# Patient Record
Sex: FEMALE | URBAN - METROPOLITAN AREA
[De-identification: names, ages, dates, MRNs, and addresses within clinical notes are randomized per-mention and may not be internally consistent; named-entity substitution may affect disease eponyms.]

---

## 2022-02-10 ENCOUNTER — OFFICE VISIT (OUTPATIENT)
Dept: FAMILY MEDICINE CLINIC | Age: 57
End: 2022-02-10
Payer: MEDICARE

## 2022-02-10 VITALS
BODY MASS INDEX: 36.37 KG/M2 | RESPIRATION RATE: 18 BRPM | DIASTOLIC BLOOD PRESSURE: 67 MMHG | TEMPERATURE: 98.2 F | HEART RATE: 105 BPM | HEIGHT: 64 IN | SYSTOLIC BLOOD PRESSURE: 107 MMHG | OXYGEN SATURATION: 97 % | WEIGHT: 213 LBS

## 2022-02-10 DIAGNOSIS — E11.9 TYPE 2 DIABETES MELLITUS WITHOUT COMPLICATION, WITHOUT LONG-TERM CURRENT USE OF INSULIN (HCC): Primary | ICD-10-CM

## 2022-02-10 DIAGNOSIS — R07.89 OTHER CHEST PAIN: ICD-10-CM

## 2022-02-10 PROCEDURE — G8427 DOCREV CUR MEDS BY ELIG CLIN: HCPCS | Performed by: NURSE PRACTITIONER

## 2022-02-10 PROCEDURE — 3017F COLORECTAL CA SCREEN DOC REV: CPT | Performed by: NURSE PRACTITIONER

## 2022-02-10 PROCEDURE — 99204 OFFICE O/P NEW MOD 45 MIN: CPT | Performed by: NURSE PRACTITIONER

## 2022-02-10 PROCEDURE — G8417 CALC BMI ABV UP PARAM F/U: HCPCS | Performed by: NURSE PRACTITIONER

## 2022-02-10 PROCEDURE — 2022F DILAT RTA XM EVC RTNOPTHY: CPT | Performed by: NURSE PRACTITIONER

## 2022-02-10 PROCEDURE — 93000 ELECTROCARDIOGRAM COMPLETE: CPT | Performed by: NURSE PRACTITIONER

## 2022-02-10 PROCEDURE — 3046F HEMOGLOBIN A1C LEVEL >9.0%: CPT | Performed by: NURSE PRACTITIONER

## 2022-02-10 PROCEDURE — G8432 DEP SCR NOT DOC, RNG: HCPCS | Performed by: NURSE PRACTITIONER

## 2022-02-10 RX ORDER — FLASH GLUCOSE SENSOR
KIT MISCELLANEOUS
Qty: 6 KIT | Refills: 2 | Status: SHIPPED | OUTPATIENT
Start: 2022-02-10 | End: 2022-02-16 | Stop reason: SDUPTHER

## 2022-02-10 RX ORDER — SPIRONOLACTONE 100 MG/1
TABLET, FILM COATED ORAL
COMMUNITY
Start: 2022-01-13

## 2022-02-10 RX ORDER — METFORMIN HYDROCHLORIDE 1000 MG/1
1000 TABLET ORAL 2 TIMES DAILY WITH MEALS
COMMUNITY
End: 2022-02-10 | Stop reason: SDUPTHER

## 2022-02-10 RX ORDER — ADAPALENE 3 MG/G
GEL TOPICAL DAILY
Qty: 45 G | Status: CANCELLED | OUTPATIENT
Start: 2022-02-10

## 2022-02-10 RX ORDER — ADAPALENE 3 MG/G
GEL TOPICAL DAILY
COMMUNITY
Start: 2021-09-22 | End: 2022-03-15

## 2022-02-10 RX ORDER — METFORMIN HYDROCHLORIDE 1000 MG/1
1000 TABLET ORAL 2 TIMES DAILY WITH MEALS
Qty: 180 TABLET | Refills: 0 | Status: SHIPPED | OUTPATIENT
Start: 2022-02-10 | End: 2022-04-13

## 2022-02-10 RX ORDER — SPIRONOLACTONE 100 MG/1
TABLET, FILM COATED ORAL
Status: CANCELLED | OUTPATIENT
Start: 2022-02-10

## 2022-02-10 NOTE — PROGRESS NOTES
[facility], [facility] [address]               [phone]      Kana Chris is a 64 y.o. female and presents with Establish Care (Patient state I would like to dicuss diabetic issues.) and Diabetes       Assessment/Plan:    Diagnoses and all orders for this visit:    1. Type 2 diabetes mellitus without complication, without long-term current use of insulin (HCC)  -     metFORMIN (GLUCOPHAGE) 1,000 mg tablet; Take 1 Tablet by mouth two (2) times daily (with meals). Take 1,000 mg by mouth two (2) times daily (with meals). -     METABOLIC PANEL, COMPREHENSIVE; Future  -     LIPID PANEL; Future  -     HEMOGLOBIN A1C WITH EAG; Future  -     MICROALBUMIN, UR, RAND W/ MICROALB/CREAT RATIO; Future  New diagnosis, has lab work from 11/24/21 showing A1C of 14.8%  Start metformin  Educated on diabetes: relationship to carbohydrates and sugars, roll of exercise in glucose control, healthy eating  Follow up labs prior to next visit  Patient verbalized understanding and is in agreement with this plan of care  2. Other chest pain  -     AMB POC EKG ROUTINE W/ 12 LEADS, INTER & REP  Endorses chest pain of several years duration, happens with activity and rest  12 lead EKG obtained, NSR, no ST or T wave abnormalities, no pathological Q waves  Reviewed by me  This is most likely muscular do to the long standing nature  Will continue to monitor      Follow-up and Dispositions    · Return in about 4 weeks (around 3/10/2022) for DM-new onset, 15 min, office only, with, labs prior.            Health Maintenance:   Health Maintenance   Topic Date Due    Hepatitis C Screening  Never done    COVID-19 Vaccine (1) 07/26/1970    Pneumococcal 0-64 years (1 of 2 - PPSV23) Never done    Foot Exam Q1  Never done    A1C test (Diabetic or Prediabetic)  Never done    MICROALBUMIN Q1  Never done    Eye Exam Retinal or Dilated  Never done    Lipid Screen  Never done    Cervical cancer screen  Never done  Colorectal Cancer Screening Combo  Never done    Shingrix Vaccine Age 50> (1 of 2) Never done    Breast Cancer Screen Mammogram  Never done    Medicare Yearly Exam  02/10/2022    Depression Screen  02/10/2023    DTaP/Tdap/Td series (2 - Td or Tdap) 2023    Flu Vaccine  Completed        Subjective:    Labs obtained prior to visit? NO  Reviewed with patient? Not applicable    Visit to establish care, ivelisse pcp was with [facility]  Had pap and mammo in     Chest pain  Onset: past few years  Location: left side of chest, sometimes above breast, sometimes stabbing below breast and sometimes feels like in my breast  Happens with rest and activity  Happens more so with rest  Has changed to underwire bra for better support  Denies radiation, n/v, diaphoresis      DMII- new diagnosis, educated today   Patient reports medication compliance Daily  Diabetic diet compliance most of the time  Patient monitors blood sugars regularly does not have a glucometer   Reports am fasting sugars range does not check   Denies hypoglycemic episodes yes  Denies polyuria, polydipsia, paraesthesia, vision changes? yes  Engaging in daily exercise? Yes: Comment: walkinmin, step aerobics, spinning     Diabetic Foot and Eye Exam  Status   Topic Date Due    Diabetic Foot Care  Never done    Eye Exam  Never done     No results found for: HBA1C, [id number], [id number]]  No results found for: CREAU, MCAU2, MCACR  Key Antihyperglycemic Medications             metFORMIN (GLUCOPHAGE) 1,000 mg tablet (Taking) Take 1 Tablet by mouth two (2) times daily (with meals). Take 1,000 mg by mouth two (2) times daily (with meals). ROS:     ROS  As stated in HPI, otherwise all others negative. The problem list was updated as a part of today's visit.   Patient Active Problem List   Diagnosis Code    Acne L70.9    Type 2 diabetes mellitus without complication, without long-term current use of insulin (HCC) E11.9       The PS,  were reviewed. SH:  Social History     Tobacco Use    Smoking status: Not on file    Smokeless tobacco: Never Used   Substance Use Topics    Alcohol use: Never    Drug use: Never       Medications/Allergies:  Current Outpatient Medications on File Prior to Visit   Medication Sig Dispense Refill    adapalene (DIFFERIN) 0.3 % topical gel Apply  to affected area daily.  spironolactone (ALDACTONE) 100 mg tablet        No current facility-administered medications on file prior to visit. Not on File    Objective:  Visit Vitals  /67 (BP 1 Location: Left arm, BP Patient Position: Sitting, BP Cuff Size: Adult) Comment (BP Patient Position): feet flat on floor   Pulse (!) 105   Temp 98.2 °F (36.8 °C) (Temporal)   Resp 18   Ht 5' 4\" (1.626 m)   Wt 213 lb (96.6 kg)   SpO2 97%   BMI 36.56 kg/m²    Body mass index is 36.56 kg/m². Physical assessment  Physical Exam  Vitals and nursing note reviewed. Eyes:      Conjunctiva/sclera: Conjunctivae normal.      Pupils: Pupils are equal, round, and reactive to light. Cardiovascular:      Rate and Rhythm: Normal rate and regular rhythm. Heart sounds: Normal heart sounds. No murmur heard. No friction rub. No gallop. Pulmonary:      Effort: Pulmonary effort is normal.      Breath sounds: Normal breath sounds. Musculoskeletal:         General: Normal range of motion. Cervical back: Normal range of motion. Skin:     General: Skin is warm and dry. Neurological:      Mental Status: She is alert.            Labwork and Ancillary Studies:    CBC w/Diff  No results found for: WBC, WBCLT, HGB, HGBP, PLT, HGBEXT, PLTEXT, HGBEXT, PLTEXT      Basic Metabolic Profile  No results found for: NA, K, CL, CO2, AGAP, GLU, BUN, CREA, BUCR, GFRAA, GFRNA, CA, GFRAA     Cholesterol  No results found for: CHOL, CHOLX, CHLST, CHOLV, HDL, HDLP, LDL, LDLC, DLDLP, TGLX, TRIGL, TRIGP, CHHD, CHHDX        I have discussed the diagnosis with the patient and the intended plan as seen in the above orders. The patient has received an After-Visit Summary and questions were answered concerning future plans. An After Visit Summary was printed and given to the patient. All diagnosis have been discussed with the patient and all of the patient's questions have been answered. Follow-up and Dispositions    · Return in about 4 weeks (around 3/10/2022) for DM-new onset, 15 min, office only, with, labs prior. [facility]  [address] [facility]   [address] [facility] [address]  [address]

## 2022-02-10 NOTE — PROGRESS NOTES
Room 7    Did patient bring someone? No    Did the patient have DME equipment? No     Did you take your medication today? No     1. \"Have you been to the ER, urgent care clinic since your last visit? Hospitalized since your last visit? \" No    2. \"Have you seen or consulted any other health care providers outside of the [address] [place] [address] since your last visit? \" Yes Patient states I was going to Dr. Devika Medina for Acne and  at [facility]      3. For patients aged 39-70: Has the patient had a colonoscopy / FIT/ Cologuard? No      If the patient is female:    4. For patients aged 41-77: Has the patient had a mammogram within the past 2 years? No Patient state I had my mammo done around 18 months ago through [facility]       5. For patients aged 21-65: Has the patient had a pap smear? NoPatient states I received a pap when I did the mammo .           3 most recent PHQ Screens 2/10/2022   Little interest or pleasure in doing things Not at all   Feeling down, depressed, irritable, or hopeless Not at all   Total Score PHQ 2 0         Health Maintenance Due   Topic Date Due    Hepatitis C Screening  Never done    Depression Screen  Never done    COVID-19 Vaccine (1) 07/26/1970    DTaP/Tdap/Td series (1 - Tdap) Never done    Cervical cancer screen  Never done    Lipid Screen  Never done    Colorectal Cancer Screening Combo  Never done    Shingrix Vaccine Age 50> (1 of 2) Never done    Breast Cancer Screen Mammogram  Never done       Learning Assessment 2/10/2022   PRIMARY LEARNER Patient   PRIMARY LANGUAGE ENGLISH   LEARNER PREFERENCE PRIMARY OTHER (COMMENT)   ANSWERED BY Patient    RELATIONSHIP SELF

## 2022-02-15 ENCOUNTER — TELEPHONE (OUTPATIENT)
Dept: FAMILY MEDICINE CLINIC | Age: 57
End: 2022-02-15

## 2022-02-15 DIAGNOSIS — E11.9 TYPE 2 DIABETES MELLITUS WITHOUT COMPLICATION, WITHOUT LONG-TERM CURRENT USE OF INSULIN (HCC): ICD-10-CM

## 2022-02-15 NOTE — TELEPHONE ENCOUNTER
Patient called to let you know that the Araya CGM is not covered under medicare. Patient would like Lamar CGM prescriptions sent to [address] [place] [address] [facility]. (In chart.) so she can use the [employer] card.

## 2022-02-16 RX ORDER — FLASH GLUCOSE SENSOR
KIT MISCELLANEOUS
Qty: 6 KIT | Refills: 2 | Status: SHIPPED | OUTPATIENT
Start: 2022-02-16

## 2022-02-22 NOTE — TELEPHONE ENCOUNTER
Spoke to patient about prescription has been sent to pharmacy. Patient states the Araya is still high and I would like to switch to the regular meter ans strips and lancets .

## 2022-02-23 RX ORDER — INSULIN PUMP SYRINGE, 3 ML
EACH MISCELLANEOUS
Qty: 1 KIT | Refills: 0 | Status: SHIPPED | OUTPATIENT
Start: 2022-02-23

## 2022-02-23 RX ORDER — LANCETS
EACH MISCELLANEOUS
Qty: 100 EACH | Refills: 3 | Status: SHIPPED | OUTPATIENT
Start: 2022-02-23

## 2022-03-10 ENCOUNTER — HOSPITAL ENCOUNTER (OUTPATIENT)
Dept: LAB | Age: 57
Discharge: HOME | End: 2022-03-10
Payer: MEDICARE

## 2022-03-10 LAB
ALBUMIN SERPL-MCNC: 4 G/DL (ref 3.4–5)
ALBUMIN/GLOB SERPL: 1.1 {RATIO} (ref 0.8–1.7)
ALP SERPL-CCNC: 87 U/L (ref 45–117)
ALT SERPL-CCNC: 35 U/L (ref 13–56)
ANION GAP SERPL CALC-SCNC: 1 MMOL/L (ref 3–18)
AST SERPL-CCNC: 32 U/L (ref 10–38)
BILIRUB SERPL-MCNC: 0.4 MG/DL (ref 0.2–1)
BUN SERPL-MCNC: 13 MG/DL (ref 7–18)
BUN/CREAT SERPL: 12 (ref 12–20)
CALCIUM SERPL-MCNC: 9.9 MG/DL (ref 8.5–10.1)
CHLORIDE SERPL-SCNC: 108 MMOL/L (ref 100–111)
CHOLEST SERPL-MCNC: 183 MG/DL
CO2 SERPL-SCNC: 28 MMOL/L (ref 21–32)
CREAT SERPL-MCNC: 1.07 MG/DL (ref 0.6–1.3)
CREAT UR-MCNC: 87 MG/DL (ref 30–125)
EST. AVERAGE GLUCOSE BLD GHB EST-MCNC: 134 MG/DL
GLOBULIN SER CALC-MCNC: 3.6 G/DL (ref 2–4)
GLUCOSE SERPL-MCNC: 100 MG/DL (ref 74–99)
HBA1C MFR BLD: 6.3 % (ref 4.2–5.6)
HDLC SERPL-MCNC: 61 MG/DL (ref 40–60)
HDLC SERPL: 3 {RATIO} (ref 0–5)
LDLC SERPL CALC-MCNC: 105.8 MG/DL (ref 0–100)
LIPID PROFILE,FLP: ABNORMAL
MICROALBUMIN UR-MCNC: 0.84 MG/DL (ref 0–3)
MICROALBUMIN/CREAT UR-RTO: 10 MG/G (ref 0–30)
POTASSIUM SERPL-SCNC: 5.3 MMOL/L (ref 3.5–5.5)
PROT SERPL-MCNC: 7.6 G/DL (ref 6.4–8.2)
SODIUM SERPL-SCNC: 137 MMOL/L (ref 136–145)
TRIGL SERPL-MCNC: 81 MG/DL (ref ?–150)
VLDLC SERPL CALC-MCNC: 16.2 MG/DL

## 2022-03-10 PROCEDURE — 36415 COLL VENOUS BLD VENIPUNCTURE: CPT

## 2022-03-10 PROCEDURE — 83036 HEMOGLOBIN GLYCOSYLATED A1C: CPT

## 2022-03-10 PROCEDURE — 80061 LIPID PANEL: CPT

## 2022-03-10 PROCEDURE — 80053 COMPREHEN METABOLIC PANEL: CPT

## 2022-03-10 PROCEDURE — 82043 UR ALBUMIN QUANTITATIVE: CPT

## 2022-03-15 ENCOUNTER — OFFICE VISIT (OUTPATIENT)
Dept: FAMILY MEDICINE CLINIC | Age: 57
End: 2022-03-15
Payer: MEDICARE

## 2022-03-15 VITALS
TEMPERATURE: 98.2 F | RESPIRATION RATE: 18 BRPM | BODY MASS INDEX: 37.01 KG/M2 | HEART RATE: 88 BPM | HEIGHT: 64 IN | SYSTOLIC BLOOD PRESSURE: 107 MMHG | WEIGHT: 216.8 LBS | DIASTOLIC BLOOD PRESSURE: 76 MMHG

## 2022-03-15 DIAGNOSIS — Z12.11 COLON CANCER SCREENING: ICD-10-CM

## 2022-03-15 DIAGNOSIS — Z12.31 ENCOUNTER FOR SCREENING MAMMOGRAM FOR MALIGNANT NEOPLASM OF BREAST: ICD-10-CM

## 2022-03-15 DIAGNOSIS — E11.9 TYPE 2 DIABETES MELLITUS WITHOUT COMPLICATION, WITHOUT LONG-TERM CURRENT USE OF INSULIN (HCC): Primary | ICD-10-CM

## 2022-03-15 PROCEDURE — G8417 CALC BMI ABV UP PARAM F/U: HCPCS | Performed by: NURSE PRACTITIONER

## 2022-03-15 PROCEDURE — 2022F DILAT RTA XM EVC RTNOPTHY: CPT | Performed by: NURSE PRACTITIONER

## 2022-03-15 PROCEDURE — 3017F COLORECTAL CA SCREEN DOC REV: CPT | Performed by: NURSE PRACTITIONER

## 2022-03-15 PROCEDURE — 99213 OFFICE O/P EST LOW 20 MIN: CPT | Performed by: NURSE PRACTITIONER

## 2022-03-15 PROCEDURE — G9899 SCRN MAM PERF RSLTS DOC: HCPCS | Performed by: NURSE PRACTITIONER

## 2022-03-15 PROCEDURE — G8427 DOCREV CUR MEDS BY ELIG CLIN: HCPCS | Performed by: NURSE PRACTITIONER

## 2022-03-15 PROCEDURE — 3044F HG A1C LEVEL LT 7.0%: CPT | Performed by: NURSE PRACTITIONER

## 2022-03-15 PROCEDURE — G8432 DEP SCR NOT DOC, RNG: HCPCS | Performed by: NURSE PRACTITIONER

## 2022-03-15 NOTE — PROGRESS NOTES
Room 9    Did patient bring someone? No    Did the patient have DME equipment? No     Did you take your medication today? Patient states not yet       1. \"Have you been to the ER, urgent care clinic since your last visit? Hospitalized since your last visit? \" No    2. \"Have you seen or consulted any other health care providers outside of the [address] [place] [address] since your last visit? \" No     3. For patients aged 39-70: Has the patient had a colonoscopy / FIT/ Cologuard? No      If the patient is female:    4. For patients aged 41-77: Has the patient had a mammogram within the past 2 years? No      5. For patients aged 21-65: Has the patient had a pap smear?  No Patient states I will schedule an appointment for pap        3 most recent PHQ Screens 3/15/2022   Little interest or pleasure in doing things Not at all   Feeling down, depressed, irritable, or hopeless Not at all   Total Score PHQ 2 0         Health Maintenance Due   Topic Date Due    Hepatitis C Screening  Never done    COVID-19 Vaccine (1) 07/26/1970    Pneumococcal 0-64 years (1 of 2 - PPSV23) Never done    Foot Exam Q1  Never done    Eye Exam Retinal or Dilated  Never done    Cervical cancer screen  Never done    Colorectal Cancer Screening Combo  Never done    Shingrix Vaccine Age 50> (1 of 2) Never done    Breast Cancer Screen Mammogram  Never done    Medicare Yearly Exam  Never done       Learning Assessment 2/10/2022   PRIMARY LEARNER Patient   PRIMARY LANGUAGE ENGLISH   LEARNER PREFERENCE PRIMARY OTHER (COMMENT)   ANSWERED BY Patient    RELATIONSHIP SELF

## 2022-03-15 NOTE — PROGRESS NOTES
[place]               [facility] [address]               [phone]      Franky Mustafa is a 64 y.o. female and presents with Follow Up Chronic Condition and Diabetes       Assessment/Plan:    Diagnoses and all orders for this visit:    1. Type 2 diabetes mellitus without complication, without long-term current use of insulin (HCC)  -     METABOLIC PANEL, COMPREHENSIVE; Future  -     HEMOGLOBIN A1C WITH EAG; Future  Diabetes well controlled on metformin  A1C 6.3%  Patient working diligently on her diet and exercise  Commended her on her efforts  Continue metformin  Labs prior to next visit  Patient verbalized understanding and is in agreement with this plan of care  2. Encounter for screening mammogram for malignant neoplasm of breast  -     ALEKSEY MAMMO BI SCREENING INCL CAD; Future  Health maintenance needs addressed  3. Colon cancer screening  -     COLOGUARD TEST (FECAL DNA COLORECTAL CANCER SCREENING)  Health maintenance needs addressed      Follow-up and Dispositions    · Return in about 3 months (around 6/15/2022) for DM, DM foot, 30 min, office only, with, labs prior. Health Maintenance:   Health Maintenance   Topic Date Due    Hepatitis C Screening  Never done    COVID-19 Vaccine (1) 07/26/1970    Pneumococcal 0-64 years (1 of 2 - PPSV23) Never done    Foot Exam Q1  Never done    Cervical cancer screen  Never done    Colorectal Cancer Screening Combo  Never done    Shingrix Vaccine Age 50> (1 of 2) Never done    Breast Cancer Screen Mammogram  Never done    Medicare Yearly Exam  Never done    Eye Exam Retinal or Dilated  07/15/2022 (Originally 7/26/1975)    Depression Screen  02/10/2023    A1C test (Diabetic or Prediabetic)  03/10/2023    MICROALBUMIN Q1  03/10/2023    Lipid Screen  03/10/2023    DTaP/Tdap/Td series (3 - Td or Tdap) 11/24/2031    Flu Vaccine  Completed        Subjective:    Labs obtained prior to visit? YES  Reviewed with patient? Yes    Diabetes  Follow up from starting metformin 3 months ago  A1C good at 6.3%  Home glucose readings averaging   States she has been exercising and dieting  30-45min a day: treadmill, bike, aerobics, trampoline  Monitoring her carbohydrate and sugar intake      ROS:     ROS  As stated in HPI, otherwise all others negative. The problem list was updated as a part of today's visit. Patient Active Problem List   Diagnosis Code    Acne L70.9    Type 2 diabetes mellitus without complication, without long-term current use of insulin ([facility]) E11.9       The PSH, FH were reviewed. SH:  Social History     Tobacco Use    Smoking status: Never Smoker    Smokeless tobacco: Never Used   Substance Use Topics    Alcohol use: Never    Drug use: Never       Medications/Allergies:  Current Outpatient Medications on File Prior to Visit   Medication Sig Dispense Refill    Blood-Glucose Meter monitoring kit Check glucose once a day in the morning prior to breakfast 1 Kit 0    lancets misc Check glucose once a day in the morning prior to breakfast 100 Each 3    glucose blood VI test strips (ASCENSIA AUTODISC VI, ONE TOUCH ULTRA TEST VI) strip Check glucose once a day in the morning prior to breakfast 100 Strip 3    flash glucose sensor (FreeStyle Lamar 2 Sensor) kit Check blood sugar four times a day: AC and HS 6 Kit 2    spironolactone (ALDACTONE) 100 mg tablet       metFORMIN (GLUCOPHAGE) 1,000 mg tablet Take 1 Tablet by mouth two (2) times daily (with meals). Take 1,000 mg by mouth two (2) times daily (with meals). 180 Tablet 0    [DISCONTINUED] adapalene (DIFFERIN) 0.3 % topical gel Apply  to affected area daily. (Patient not taking: Reported on 3/15/2022)       No current facility-administered medications on file prior to visit.         Not on File    Objective:  Visit Vitals  /76 (BP 1 Location: Left arm, BP Patient Position: Sitting, BP Cuff Size: Small adult) Comment (BP Patient Position): feet flat on floor   Pulse 88   Temp 98.2 °F (36.8 °C) (Temporal)   Resp 18   Ht 5' 4\" (1.626 m)   Wt 216 lb 12.8 oz (98.3 kg)   BMI 37.21 kg/m²    Body mass index is 37.21 kg/m². Physical assessment  Physical Exam  Vitals and nursing note reviewed. Eyes:      Conjunctiva/sclera: Conjunctivae normal.      Pupils: Pupils are equal, round, and reactive to light. Cardiovascular:      Rate and Rhythm: Normal rate and regular rhythm. Heart sounds: Normal heart sounds. No murmur heard. No friction rub. No gallop. Pulmonary:      Effort: Pulmonary effort is normal.      Breath sounds: Normal breath sounds. Musculoskeletal:         General: Normal range of motion. Cervical back: Normal range of motion. Skin:     General: Skin is warm and dry. Neurological:      Mental Status: She is alert. Labwork and Ancillary Studies:    CBC w/Diff  No results found for: WBC, WBCLT, HGB, HGBP, PLT, HGBEXT, PLTEXT, HGBEXT, PLTEXT      Basic Metabolic Profile  Lab Results   Component Value Date/Time    Sodium 137 03/10/2022 03:06 PM    Potassium 5.3 03/10/2022 03:06 PM    Chloride 108 03/10/2022 03:06 PM    CO2 28 03/10/2022 03:06 PM    Anion gap 1 (L) 03/10/2022 03:06 PM    Glucose 100 (H) 03/10/2022 03:06 PM    BUN 13 03/10/2022 03:06 PM    Creatinine 1.07 03/10/2022 03:06 PM    BUN/Creatinine ratio 12 03/10/2022 03:06 PM    GFR est AA >60 03/10/2022 03:06 PM    GFR est non-AA 53 (L) 03/10/2022 03:06 PM    Calcium 9.9 03/10/2022 03:06 PM        Cholesterol  Lab Results   Component Value Date/Time    Cholesterol, total 183 03/10/2022 03:06 PM    HDL Cholesterol 61 (H) 03/10/2022 03:06 PM    LDL, calculated 105.8 (H) 03/10/2022 03:06 PM    Triglyceride 81 03/10/2022 03:06 PM    CHOL/HDL Ratio 3.0 03/10/2022 03:06 PM           I have discussed the diagnosis with the patient and the intended plan as seen in the above orders.   The patient has received an After-Visit Summary and questions were answered concerning future plans. An After Visit Summary was printed and given to the patient. All diagnosis have been discussed with the patient and all of the patient's questions have been answered. Follow-up and Dispositions    · Return in about 3 months (around 6/15/2022) for DM, DM foot, 30 min, office only, with, labs prior. Dawood Estevez, AGNP-BC  [address]  [address]

## 2022-03-19 PROBLEM — E11.9 TYPE 2 DIABETES MELLITUS WITHOUT COMPLICATION, WITHOUT LONG-TERM CURRENT USE OF INSULIN (HCC): Status: ACTIVE | Noted: 2022-02-10

## 2022-04-13 DIAGNOSIS — E11.9 TYPE 2 DIABETES MELLITUS WITHOUT COMPLICATION, WITHOUT LONG-TERM CURRENT USE OF INSULIN (HCC): ICD-10-CM

## 2022-04-13 RX ORDER — METFORMIN HYDROCHLORIDE 1000 MG/1
TABLET ORAL
Qty: 180 TABLET | Refills: 3 | Status: SHIPPED | OUTPATIENT
Start: 2022-04-13 | End: 2022-06-15 | Stop reason: SDUPTHER

## 2022-06-08 ENCOUNTER — HOSPITAL ENCOUNTER (OUTPATIENT)
Dept: LAB | Age: 57
Discharge: HOME | End: 2022-06-08
Payer: MEDICARE

## 2022-06-08 LAB
ALBUMIN SERPL-MCNC: 4 G/DL (ref 3.4–5)
ALBUMIN/GLOB SERPL: 1.1 {RATIO} (ref 0.8–1.7)
ALP SERPL-CCNC: 84 U/L (ref 45–117)
ALT SERPL-CCNC: 37 U/L (ref 13–56)
ANION GAP SERPL CALC-SCNC: 3 MMOL/L (ref 3–18)
AST SERPL-CCNC: 32 U/L (ref 10–38)
BILIRUB SERPL-MCNC: 0.4 MG/DL (ref 0.2–1)
BUN SERPL-MCNC: 13 MG/DL (ref 7–18)
BUN/CREAT SERPL: 14 (ref 12–20)
CALCIUM SERPL-MCNC: 9.6 MG/DL (ref 8.5–10.1)
CHLORIDE SERPL-SCNC: 108 MMOL/L (ref 100–111)
CO2 SERPL-SCNC: 26 MMOL/L (ref 21–32)
CREAT SERPL-MCNC: 0.96 MG/DL (ref 0.6–1.3)
EST. AVERAGE GLUCOSE BLD GHB EST-MCNC: 131 MG/DL
GLOBULIN SER CALC-MCNC: 3.8 G/DL (ref 2–4)
GLUCOSE SERPL-MCNC: 113 MG/DL (ref 74–99)
HBA1C MFR BLD: 6.2 % (ref 4.2–5.6)
POTASSIUM SERPL-SCNC: 4.7 MMOL/L (ref 3.5–5.5)
PROT SERPL-MCNC: 7.8 G/DL (ref 6.4–8.2)
SODIUM SERPL-SCNC: 137 MMOL/L (ref 136–145)

## 2022-06-08 PROCEDURE — 83036 HEMOGLOBIN GLYCOSYLATED A1C: CPT

## 2022-06-08 PROCEDURE — 36415 COLL VENOUS BLD VENIPUNCTURE: CPT

## 2022-06-08 PROCEDURE — 80053 COMPREHEN METABOLIC PANEL: CPT

## 2022-06-14 ENCOUNTER — TELEPHONE (OUTPATIENT)
Dept: PHARMACY | Age: 57
End: 2022-06-14

## 2022-06-14 DIAGNOSIS — E11.9 TYPE 2 DIABETES MELLITUS WITHOUT COMPLICATION, WITHOUT LONG-TERM CURRENT USE OF INSULIN (HCC): Primary | ICD-10-CM

## 2022-06-14 NOTE — TELEPHONE ENCOUNTER
Sofia Tony NP - would patient benefit from statin therapy? Peggy Frey has an appointment with you 6/15/2022. She has been identified as a person for a care gap of Diabetes with out a statin  Diabetes, Over 39y/o and LDL above goal of 70    She is a candidate for at least moderate-intensity statin therapy. Please consider starting a statin at this OV for this patient. Thank you,  Moon Brizuela, PharmD, [address] [facility], toll free: [phone] Option 2  ==============================================================  POPULATION HEALTH CLINICAL PHARMACY: STATIN THERAPY REVIEW  Identified statin use in persons with diabetes care gap per [employer]  Records dated: 06/14/2022. Last Visit: 03/15/2022    ASSESSMENT  DIABETES ADHERENCE  Per Insurance Records through 06/14/2022 (2021 [place] = NA; YTD [place] = 100%; Potential Fail Date: 10/13/2022): Metformin last filled on 04/25/2022 for 90 day supply. Next refill due: 07/24/2022       Per [facility]:   Metformin last picked up on 04/25/2022 for 90 day supply. 3 refills remaining.  Billed through [facility] Rx Value Date/Time    Hemoglobin A1c 6.2 (H) 06/08/2022 03:24 PM    Hemoglobin A1c 6.3 (H) 03/10/2022 03:06 PM     NOTE A1c <9%    STATIN GAP IDENTIFIED    Patient is not prescribed a statin   Lab Results   Component Value Date/Time    Cholesterol, total 183 03/10/2022 03:06 PM    HDL Cholesterol 61 (H) 03/10/2022 03:06 PM    LDL, calculated 105.8 (H) 03/10/2022 03:06 PM    VLDL, calculated 16.2 03/10/2022 03:06 PM    Triglyceride 81 03/10/2022 03:06 PM    CHOL/HDL Ratio 3.0 03/10/2022 03:06 PM     ALT (SGPT)   Date Value Ref Range Status   06/08/2022 37 13 - 56 U/L Final     AST (SGOT)   Date Value Ref Range Status   06/08/2022 32 10 - 38 U/L Final     The ASCVD Risk score (James Bernardo., et al., 2013) failed to calculate for the following reasons:    Unable to determine if patient is Non-      Hyperlipidemia Goal: Patient is not currently prescribed moderate-intensity statin therapy. 2019 ADA Guidelines Age:   Ning Do[place] >/= 36years old:   o No history of ASCVD or 10-year ASCVD risk < 20% - moderate-intensity statin is recommended.   o History of ASCVD or 10-year ASCVD risk > 20% - high-intensity statin is recommended.      PLAN  The following are interventions that have been identified:  - Patient identified as having SUPD gap        Future Appointments   Date Time Provider Rishabh Silveira   6/15/2022  3:15 PM ALISIA Choi 49, PharmD, [address] [place] [address], toll free: [phone] Option 2

## 2022-06-15 ENCOUNTER — OFFICE VISIT (OUTPATIENT)
Dept: FAMILY MEDICINE CLINIC | Age: 57
End: 2022-06-15
Payer: MEDICARE

## 2022-06-15 VITALS
OXYGEN SATURATION: 100 % | TEMPERATURE: 98.2 F | RESPIRATION RATE: 18 BRPM | HEART RATE: 97 BPM | SYSTOLIC BLOOD PRESSURE: 109 MMHG | HEIGHT: 64 IN | WEIGHT: 222 LBS | DIASTOLIC BLOOD PRESSURE: 74 MMHG | BODY MASS INDEX: 37.9 KG/M2

## 2022-06-15 DIAGNOSIS — Z00.00 MEDICARE ANNUAL WELLNESS VISIT, SUBSEQUENT: Primary | ICD-10-CM

## 2022-06-15 DIAGNOSIS — E11.9 TYPE 2 DIABETES MELLITUS WITHOUT COMPLICATION, WITHOUT LONG-TERM CURRENT USE OF INSULIN (HCC): ICD-10-CM

## 2022-06-15 PROCEDURE — G8427 DOCREV CUR MEDS BY ELIG CLIN: HCPCS | Performed by: NURSE PRACTITIONER

## 2022-06-15 PROCEDURE — G0439 PPPS, SUBSEQ VISIT: HCPCS | Performed by: NURSE PRACTITIONER

## 2022-06-15 PROCEDURE — G8432 DEP SCR NOT DOC, RNG: HCPCS | Performed by: NURSE PRACTITIONER

## 2022-06-15 PROCEDURE — 3044F HG A1C LEVEL LT 7.0%: CPT | Performed by: NURSE PRACTITIONER

## 2022-06-15 PROCEDURE — 99213 OFFICE O/P EST LOW 20 MIN: CPT | Performed by: NURSE PRACTITIONER

## 2022-06-15 PROCEDURE — 3017F COLORECTAL CA SCREEN DOC REV: CPT | Performed by: NURSE PRACTITIONER

## 2022-06-15 PROCEDURE — G9899 SCRN MAM PERF RSLTS DOC: HCPCS | Performed by: NURSE PRACTITIONER

## 2022-06-15 RX ORDER — METFORMIN HYDROCHLORIDE 1000 MG/1
1000 TABLET ORAL 2 TIMES DAILY WITH MEALS
Qty: 180 TABLET | Refills: 3 | Status: SHIPPED | OUTPATIENT
Start: 2022-06-15

## 2022-06-15 NOTE — PATIENT INSTRUCTIONS
Medicare Wellness Visit, Female     The best way to live healthy is to have a lifestyle where you eat a well-balanced diet, exercise regularly, limit alcohol use, and quit all forms of tobacco/nicotine, if applicable. Regular preventive services are another way to keep healthy. Preventive services (vaccines, screening tests, monitoring & exams) can help personalize your care plan, which helps you manage your own care. Screening tests can find health problems at the earliest stages, when they are easiest to treat. [facility] follows the current, evidence-based guidelines published by the [facility] Landis ([facility]) when recommending preventive services for our patients. Because we follow these guidelines, sometimes recommendations change over time as research supports it. (For example, mammograms used to be recommended annually. Even though Medicare will still pay for an annual mammogram, the newer guidelines recommend a mammogram every two years for women of average risk). Of course, you and your doctor may decide to screen more often for some diseases, based on your risk and your co-morbidities (chronic disease you are already diagnosed with). Preventive services for you include:  - Medicare offers their members a free annual wellness visit, which is time for you and your primary care provider to discuss and plan for your preventive service needs. Take advantage of this benefit every year!  -All adults over the age of 72 should receive the recommended pneumonia vaccines. Current USPSTF guidelines recommend a series of two vaccines for the best pneumonia protection.   -All adults should have a flu vaccine yearly and a tetanus vaccine every 10 years.   -All adults age 48 and older should receive the shingles vaccines (series of two vaccines).       -All adults age 38-68 who are overweight should have a diabetes screening test once every three years.   -All adults born between [birth year] and 1965 should be screened once for Hepatitis C.  -Other screening tests and preventive services for persons with diabetes include: an eye exam to screen for diabetic retinopathy, a kidney function test, a foot exam, and stricter control over your cholesterol.   -Cardiovascular screening for adults with routine risk involves an electrocardiogram (ECG) at intervals determined by your doctor.   -Colorectal cancer screenings should be done for adults age 54-65 with no increased risk factors for colorectal cancer. There are a number of acceptable methods of screening for this type of cancer. Each test has its own benefits and drawbacks. Discuss with your doctor what is most appropriate for you during your annual wellness visit. The different tests include: colonoscopy (considered the best screening method), a fecal occult blood test, a fecal DNA test, and sigmoidoscopy.    -A bone mass density test is recommended when a woman turns 65 to screen for osteoporosis. This test is only recommended one time, as a screening. Some providers will use this same test as a disease monitoring tool if you already have osteoporosis. -Breast cancer screenings are recommended every other year for women of normal risk, age 54-69.  -Cervical cancer screenings for women over age 72 are only recommended with certain risk factors.      Here is a list of your current Health Maintenance items (your personalized list of preventive services) with a due date:  Health Maintenance Due   Topic Date Due    Hepatitis C Test  Never done    COVID-19 Vaccine (1) 07/26/1970    Pneumococcal Vaccine (1 - PCV) Never done    Diabetic Foot Care  Never done    Shingles Vaccine (1 of 2) Never done    Mammogram  Never done

## 2022-06-15 NOTE — PROGRESS NOTES
Room 9     When asked if patient has seen the (referral) patient states yes, no . Patient referral order has been re-printed and address has been highlighted for patient. When asked if patient has any concerns he would like to address with VIRGILIO Boswell patient states yes, no . Did patient bring someone? No    Did the patient have DME equipment? No     Did you take your medication today? Patient states not yet       1. \"Have you been to the ER, urgent care clinic since your last visit? Hospitalized since your last visit? \" No    2. \"Have you seen or consulted any other health care providers outside of the [address] [place] [address] since your last visit? \" No     3. For patients aged 39-70: Has the patient had a colonoscopy / FIT/ Cologuard? Yes - no Care Gap present      If the patient is female:    4. For patients aged 41-77: Has the patient had a mammogram within the past 2 years? No Patient states I have an appointment to get my mammogram on the 6/28/22. 5. For patients aged 21-65: Has the patient had a pap smear?  Yes - no Care Gap present        3 most recent PHQ Screens 6/15/2022   Little interest or pleasure in doing things Not at all   Feeling down, depressed, irritable, or hopeless Not at all   Total Score PHQ 2 0         Health Maintenance Due   Topic Date Due    Hepatitis C Screening  Never done    COVID-19 Vaccine (1) 07/26/1970    Pneumococcal 0-64 years (1 - PCV) Never done    Foot Exam Q1  Never done    Shingrix Vaccine Age 50> (1 of 2) Never done    Breast Cancer Screen Mammogram  Never done    Medicare Yearly Exam  Never done       Learning Assessment 2/10/2022   PRIMARY LEARNER Patient   PRIMARY LANGUAGE ENGLISH   LEARNER PREFERENCE PRIMARY OTHER (COMMENT)   ANSWERED BY Patient    RELATIONSHIP SELF

## 2022-06-15 NOTE — PROGRESS NOTES
Cory               [address]               [phone]      Benoit Simmons is a 64 y.o. female and presents with Follow Up Chronic Condition and Diabetes (dm foot )       Assessment/Plan:    Diagnoses and all orders for this visit:    1. Medicare annual wellness visit, subsequent  Completed today to include ETOH and depression screening and Healthcare decision maker verified   2. Type 2 diabetes mellitus without complication, without long-term current use of insulin (HCC)  -     metFORMIN (GLUCOPHAGE) 1,000 mg tablet; Take 1 Tablet by mouth two (2) times daily (with meals). -     METABOLIC PANEL, COMPREHENSIVE; Future  -     HEMOGLOBIN A1C WITH EAG; Future  Endorses medication compliance, Refill provided, Follow up labs prior to next visit and Diabetes controlled, A1C <7       Follow-up and Dispositions    · Return in about 4 months (around 10/15/2022) for DM, DM foot, w/gyn, 30 min, office only, with, labs prior. Health Maintenance:   Health Maintenance   Topic Date Due    Hepatitis C Screening  Never done    COVID-19 Vaccine (1) 07/26/1970    Pneumococcal 0-64 years (1 - PCV) Never done    Foot Exam Q1  Never done    Shingrix Vaccine Age 50> (1 of 2) Never done    Breast Cancer Screen Mammogram  Never done    Eye Exam Retinal or Dilated  07/15/2022 (Originally 7/26/1975)    MICROALBUMIN Q1  03/10/2023    Lipid Screen  03/10/2023    A1C test (Diabetic or Prediabetic)  06/08/2023    Depression Screen  06/15/2023    Medicare Yearly Exam  06/16/2023    Colorectal Cancer Screening Combo  03/25/2025    Cervical cancer screen  08/18/2025    DTaP/Tdap/Td series (3 - Td or Tdap) 11/24/2031    Flu Vaccine  Completed        Subjective:    Labs obtained prior to visit? YES  Reviewed with patient?  Yes    DMII-   Patient reports medication compliance Daily  Diabetic diet compliance most of the time  Patient monitors blood sugars regularly weekly   Reports am fasting sugars range    Denies hypoglycemic episodes yes  Denies polyuria, polydipsia, paraesthesia, vision changes? yes  Engaging in daily exercise? Yes: Comment: cardio or yoga     Diabetic Foot and Eye Exam HM Status   Topic Date Due    Diabetic Foot Care  Never done    Eye Exam  07/15/2022 (Originally 7/26/1975)     Hemoglobin A1c   Date Value Ref Range Status   06/08/2022 6.2 (H) 4.2 - 5.6 % Final     Comment:     (NOTE)  HbA1C Interpretive Ranges  <5.7              Normal  5.7 - 6.4         Consider Prediabetes  >6.5              Consider Diabetes     ]  Creatinine, urine   Date Value Ref Range Status   03/10/2022 87.00 30 - 125 mg/dL Final     Microalbumin/Creat ratio (mg/g creat)   Date Value Ref Range Status   03/10/2022 10 0 - 30 mg/g Final     Key Antihyperglycemic Medications             metFORMIN (GLUCOPHAGE) 1,000 mg tablet (Taking) Take 1 Tablet by mouth two (2) times daily (with meals). ROS:     ROS  As stated in HPI, otherwise all others negative. The problem list was updated as a part of today's visit. Patient Active Problem List   Diagnosis Code    Acne L70.9    Type 2 diabetes mellitus without complication, without long-term current use of insulin (HCC) E11.9       The PSH, FH were reviewed.       SH:  Social History     Tobacco Use    Smoking status: Never Smoker    Smokeless tobacco: Never Used   Substance Use Topics    Alcohol use: Never    Drug use: Never       Medications/Allergies:  Current Outpatient Medications on File Prior to Visit   Medication Sig Dispense Refill    Blood-Glucose Meter monitoring kit Check glucose once a day in the morning prior to breakfast 1 Kit 0    lancets misc Check glucose once a day in the morning prior to breakfast 100 Each 3    glucose blood VI test strips (ASCENSIA AUTODISC VI, ONE TOUCH ULTRA TEST VI) strip Check glucose once a day in the morning prior to breakfast 100 Strip 3    flash glucose sensor (FreeStyle Lamar 2 Sensor) kit Check blood sugar four times a day: AC and HS 6 Kit 2    spironolactone (ALDACTONE) 100 mg tablet        No current facility-administered medications on file prior to visit. Not on File    Objective:  Visit Vitals  /74 (BP 1 Location: Right arm, BP Patient Position: Sitting, BP Cuff Size: Adult) Comment (BP Patient Position): feet flat on floor   Pulse 97   Temp 98.2 °F (36.8 °C) (Temporal)   Resp 18   Ht 5' 4\" (1.626 m)   Wt 222 lb (100.7 kg)   SpO2 100%   BMI 38.11 kg/m²    Body mass index is 38.11 kg/m². Physical assessment  Physical Exam  Vitals and nursing note reviewed. Eyes:      Conjunctiva/sclera: Conjunctivae normal.      Pupils: Pupils are equal, round, and reactive to light. Cardiovascular:      Rate and Rhythm: Normal rate and regular rhythm. Heart sounds: Normal heart sounds. No murmur heard. No friction rub. No gallop. Pulmonary:      Effort: Pulmonary effort is normal.      Breath sounds: Normal breath sounds. Musculoskeletal:         General: Normal range of motion. Cervical back: Normal range of motion. Skin:     General: Skin is warm and dry. Neurological:      Mental Status: She is alert.            Labwork and Ancillary Studies:    CBC w/Diff  No results found for: WBC, WBCLT, HGB, HGBP, PLT, HGBEXT, PLTEXT, HGBEXT, PLTEXT      Basic Metabolic Profile  Lab Results   Component Value Date/Time    Sodium 137 06/08/2022 03:24 PM    Potassium 4.7 06/08/2022 03:24 PM    Chloride 108 06/08/2022 03:24 PM    CO2 26 06/08/2022 03:24 PM    Anion gap 3 06/08/2022 03:24 PM    Glucose 113 (H) 06/08/2022 03:24 PM    BUN 13 06/08/2022 03:24 PM    Creatinine 0.96 06/08/2022 03:24 PM    BUN/Creatinine ratio 14 06/08/2022 03:24 PM    GFR est AA >60 06/08/2022 03:24 PM    GFR est non-AA >60 06/08/2022 03:24 PM    Calcium 9.6 06/08/2022 03:24 PM        Cholesterol  Lab Results   Component Value Date/Time    Cholesterol, total 183 03/10/2022 03:06 PM    HDL Cholesterol 61 (H) 03/10/2022 03:06 PM    LDL, calculated 105.8 (H) 03/10/2022 03:06 PM    Triglyceride 81 03/10/2022 03:06 PM    CHOL/HDL Ratio 3.0 03/10/2022 03:06 PM           I have discussed the diagnosis with the patient and the intended plan as seen in the above orders. The patient has received an After-Visit Summary and questions were answered concerning future plans. An After Visit Summary was printed and given to the patient. All diagnosis have been discussed with the patient and all of the patient's questions have been answered. Follow-up and Dispositions    · Return in about 4 months (around 10/15/2022) for DM, DM foot, w/gyn, 30 min, office only, with, labs prior. [facility]  [address] [facility]   [address] [facility] [address] [address]  This is the Subsequent Medicare Annual Wellness Exam, performed 12 months or more after the Initial AWV or the last Subsequent AWV    I have reviewed the patient's medical history in detail and updated the computerized patient record. Assessment/Plan   Education and counseling provided:  Are appropriate based on today's review and evaluation    1. Medicare annual wellness visit, subsequent  2. Type 2 diabetes mellitus without complication, without long-term current use of insulin (HCC)  -     metFORMIN (GLUCOPHAGE) 1,000 mg tablet; Take 1 Tablet by mouth two (2) times daily (with meals). , Normal, Disp-180 Tablet, R-3Requesting 1 year supply  -     METABOLIC PANEL, COMPREHENSIVE; Future  -     HEMOGLOBIN A1C WITH EAG;  Future       Depression Risk Factor Screening     3 most recent PHQ Screens 6/15/2022   Little interest or pleasure in doing things Not at all   Feeling down, depressed, irritable, or hopeless Not at all   Total Score PHQ 2 0       Alcohol & Drug Abuse Risk Screen    Do you average more than 1 drink per night or more than 7 drinks a week:  No    On any one occasion in the past three months have you have had more than 3 drinks containing alcohol:  No          Functional Ability and Level of Safety    Hearing: Hearing is good. Activities of Daily Living: The home contains: no safety equipment. Patient does total self care      Ambulation: with no difficulty     Fall Risk:  No flowsheet data found. Abuse Screen:  Patient is not abused       Cognitive Screening    Has your family/caregiver stated any concerns about your memory: no     Cognitive Screening: Normal - 3 word recall 3/3    Health Maintenance Due     Health Maintenance Due   Topic Date Due    Hepatitis C Screening  Never done    COVID-19 Vaccine (1) 07/26/1970    Pneumococcal 0-64 years (1 - PCV) Never done    Foot Exam Q1  Never done    Shingrix Vaccine Age 50> (1 of 2) Never done    Breast Cancer Screen Mammogram  Never done       Patient Care Team   Patient Care Team:  Dari Landa NP as PCP - General (Nurse Practitioner)  Dari Landa NP as PCP - [facility] Empaneled Provider    History     Patient Active Problem List   Diagnosis Code    Acne L70.9    Type 2 diabetes mellitus without complication, without long-term current use of insulin ([address] E11.9     Past Medical History:   Diagnosis Date    Diabetes ([address]       History reviewed. No pertinent surgical history. Current Outpatient Medications   Medication Sig Dispense Refill    metFORMIN (GLUCOPHAGE) 1,000 mg tablet Take 1 Tablet by mouth two (2) times daily (with meals).  180 Tablet 3    Blood-Glucose Meter monitoring kit Check glucose once a day in the morning prior to breakfast 1 Kit 0    lancets misc Check glucose once a day in the morning prior to breakfast 100 Each 3    glucose blood VI test strips (ASCENSIA AUTODISC VI, ONE TOUCH ULTRA TEST VI) strip Check glucose once a day in the morning prior to breakfast 100 Strip 3    flash glucose sensor (FreeStyle Lamar 2 Sensor) kit Check blood sugar four times a day: AC and HS 6 Kit 2    spironolactone (ALDACTONE) 100 mg tablet       atorvastatin (LIPITOR) 10 mg tablet Take 1 Tablet by mouth daily. 90 Tablet 3     Not on File    History reviewed. No pertinent family history.   Social History     Tobacco Use    Smoking status: Never Smoker    Smokeless tobacco: Never Used   Substance Use Topics    Alcohol use: Never         Hilario Marie NP

## 2022-06-17 DIAGNOSIS — E11.9 TYPE 2 DIABETES MELLITUS WITHOUT COMPLICATION, WITHOUT LONG-TERM CURRENT USE OF INSULIN (HCC): Primary | ICD-10-CM

## 2022-06-17 RX ORDER — ATORVASTATIN CALCIUM 10 MG/1
10 TABLET, FILM COATED ORAL DAILY
Qty: 90 TABLET | Refills: 3 | Status: SHIPPED | OUTPATIENT
Start: 2022-06-17

## 2022-06-17 NOTE — TELEPHONE ENCOUNTER
Nupur Yeh NP sent a new rx for Atorvastatin 10 mg once daily. [address] asked for me to reach out to patient to discuss medication. Lm for patient to call back.  6/17/2022  Will also send a [employer] message 6/17/2022    Roxanne Morris, PharmD, [facility]   Department, toll free: [phone] Option 2    For Pharmacy [id number] [facility] in place: No   Recommendation Provided To: Provider: 1 via Note to Provider    Intervention Detail: New Rx: 1, reason: Needs Additional Therapy   Gap Closed?: Yes   Intervention Accepted By: Provider: 1   Time Spent (min): 20

## 2022-06-21 NOTE — TELEPHONE ENCOUNTER
Bhavin Carlisle NP,    Ambar Correia has sent me two messages over the weekend that she would like her atorvastatin 90 days sent to mail order. I will call [facility] and cancel the rx and phone it into mail order [facility] for you. Thank you! Michael Dill, PharmD, [address] [facility]   Department, toll free: [phone] Option 2     Disp Refills Start End    atorvastatin (LIPITOR) 10 mg tablet 90 Tablet 3 6/17/2022     Sig - Route:  Take 1 Tablet by mouth daily. - Oral    Sent to pharmacy as: atorvastatin 10 mg tablet (LIPITOR)      [place] -spoke to  Surendra Nazario she canceled rx     Called [facility]- Spoke with Crystal Smyth x 2 repeat back       ================================================================================  For Pharmacy [id number] [place] [address] Road in place: No   Recommendation Provided To: Provider: 1 via Note to Provider , Patient/Caregiver: 1 via Telephone and [facility] and Pharmacy: 2   Intervention Detail: New Rx: 1, reason: Needs Additional Therapy   Gap Closed?: Yes   Intervention Accepted By: Provider: 1, Patient/Caregiver: 1 and Pharmacy: 2   Time Spent (min): 30

## 2022-10-31 ENCOUNTER — HOSPITAL ENCOUNTER (OUTPATIENT)
Dept: LAB | Age: 57
Discharge: HOME | End: 2022-10-31
Payer: MEDICARE

## 2022-10-31 LAB
ALBUMIN SERPL-MCNC: 4.1 G/DL (ref 3.4–5)
ALBUMIN/GLOB SERPL: 1.2 {RATIO} (ref 0.8–1.7)
ALP SERPL-CCNC: 95 U/L (ref 45–117)
ALT SERPL-CCNC: 24 U/L (ref 13–56)
ANION GAP SERPL CALC-SCNC: 4 MMOL/L (ref 3–18)
AST SERPL-CCNC: 20 U/L (ref 10–38)
BILIRUB SERPL-MCNC: 0.4 MG/DL (ref 0.2–1)
BUN SERPL-MCNC: 11 MG/DL (ref 7–18)
BUN/CREAT SERPL: 12 (ref 12–20)
CALCIUM SERPL-MCNC: 9.4 MG/DL (ref 8.5–10.1)
CHLORIDE SERPL-SCNC: 110 MMOL/L (ref 100–111)
CO2 SERPL-SCNC: 25 MMOL/L (ref 21–32)
CREAT SERPL-MCNC: 0.92 MG/DL (ref 0.6–1.3)
EST. AVERAGE GLUCOSE BLD GHB EST-MCNC: 134 MG/DL
GLOBULIN SER CALC-MCNC: 3.5 G/DL (ref 2–4)
GLUCOSE SERPL-MCNC: 110 MG/DL (ref 74–99)
HBA1C MFR BLD: 6.3 % (ref 4.2–5.6)
POTASSIUM SERPL-SCNC: 5.2 MMOL/L (ref 3.5–5.5)
PROT SERPL-MCNC: 7.6 G/DL (ref 6.4–8.2)
SODIUM SERPL-SCNC: 139 MMOL/L (ref 136–145)

## 2022-10-31 PROCEDURE — 83036 HEMOGLOBIN GLYCOSYLATED A1C: CPT

## 2022-10-31 PROCEDURE — 80053 COMPREHEN METABOLIC PANEL: CPT

## 2022-10-31 PROCEDURE — 36415 COLL VENOUS BLD VENIPUNCTURE: CPT

## 2022-11-08 ENCOUNTER — OFFICE VISIT (OUTPATIENT)
Dept: FAMILY MEDICINE CLINIC | Age: 57
End: 2022-11-08
Payer: MEDICARE

## 2022-11-08 VITALS
BODY MASS INDEX: 38.86 KG/M2 | RESPIRATION RATE: 18 BRPM | HEIGHT: 64 IN | HEART RATE: 93 BPM | TEMPERATURE: 98.2 F | DIASTOLIC BLOOD PRESSURE: 85 MMHG | WEIGHT: 227.6 LBS | SYSTOLIC BLOOD PRESSURE: 130 MMHG | OXYGEN SATURATION: 99 %

## 2022-11-08 DIAGNOSIS — E66.09 CLASS 2 OBESITY DUE TO EXCESS CALORIES WITHOUT SERIOUS COMORBIDITY WITH BODY MASS INDEX (BMI) OF 39.0 TO 39.9 IN ADULT: ICD-10-CM

## 2022-11-08 DIAGNOSIS — E11.9 TYPE 2 DIABETES MELLITUS WITHOUT COMPLICATION, WITHOUT LONG-TERM CURRENT USE OF INSULIN (HCC): Primary | ICD-10-CM

## 2022-11-08 PROBLEM — E66.812 CLASS 2 OBESITY DUE TO EXCESS CALORIES WITHOUT SERIOUS COMORBIDITY WITH BODY MASS INDEX (BMI) OF 39.0 TO 39.9 IN ADULT: Status: ACTIVE | Noted: 2022-11-08

## 2022-11-08 PROCEDURE — G8432 DEP SCR NOT DOC, RNG: HCPCS | Performed by: NURSE PRACTITIONER

## 2022-11-08 PROCEDURE — 2022F DILAT RTA XM EVC RTNOPTHY: CPT | Performed by: NURSE PRACTITIONER

## 2022-11-08 PROCEDURE — 3044F HG A1C LEVEL LT 7.0%: CPT | Performed by: NURSE PRACTITIONER

## 2022-11-08 PROCEDURE — 99214 OFFICE O/P EST MOD 30 MIN: CPT | Performed by: NURSE PRACTITIONER

## 2022-11-08 PROCEDURE — G8427 DOCREV CUR MEDS BY ELIG CLIN: HCPCS | Performed by: NURSE PRACTITIONER

## 2022-11-08 PROCEDURE — G9899 SCRN MAM PERF RSLTS DOC: HCPCS | Performed by: NURSE PRACTITIONER

## 2022-11-08 PROCEDURE — 3017F COLORECTAL CA SCREEN DOC REV: CPT | Performed by: NURSE PRACTITIONER

## 2022-11-08 PROCEDURE — G8417 CALC BMI ABV UP PARAM F/U: HCPCS | Performed by: NURSE PRACTITIONER

## 2022-11-08 NOTE — PROGRESS NOTES
Cory               [address]               [phone]      Juan José Watkins is a 62 y.o. female and presents with Follow Up Chronic Condition, Diabetes, and Gyn Exam       Assessment/Plan:    Diagnoses and all orders for this visit:    1. Type 2 diabetes mellitus without complication, without long-term current use of insulin (HCC)  -     METABOLIC PANEL, BASIC; Future  -     HEMOGLOBIN A1C WITH EAG; Future  -     MICROALBUMIN, UR, RAND W/ MICROALB/CREAT RATIO; Future  Endorses medication compliance, Follow up labs prior to next visit, Denies signs and symptoms of hyper or hypoglycemia, and Diabetes controlled, A1C <7     2. Class 2 obesity due to excess calories without serious comorbidity with body mass index (BMI) of 39.0 to 39.9 in adult  Reviewed her current eating habits and ways she can make changes  Discussed the patient's BMI. The BMI follow up plan is as follows:     dietary management education, guidance, and counseling  encourage exercise  monitor weight  prescribed dietary intake  Discussed portion control  Eat fresh or frozen fruits and vegetables, stay away from canned  Limit eating out and fast food  Practice meal planning      Follow-up and Dispositions    Return in about 4 months (around 3/8/2023) for DM, DM foot, 15 min, office only, w/labs prior.            Health Maintenance:   Health Maintenance   Topic Date Due    Hepatitis C Screening  Never done    COVID-19 Vaccine (1) 01/26/1966    Pneumococcal 0-64 years (1 - PCV) Never done    Foot Exam Q1  Never done    Eye Exam Retinal or Dilated  Never done    Hepatitis B Vaccine (1 of 3 - Risk 3-dose series) Never done    DTaP/Tdap/Td series (1 - Tdap) Never done    Shingrix Vaccine Age 50> (1 of 2) Never done    MICROALBUMIN Q1  03/10/2023    Lipid Screen  03/10/2023    Medicare Yearly Exam  06/16/2023    A1C test (Diabetic or Prediabetic)  10/31/2023    Depression Screen  11/08/2023    Breast Cancer Screen Mammogram  09/27/2024    Colorectal Cancer Screening Combo  03/25/2025    Cervical cancer screen  08/18/2025    Flu Vaccine  Completed        Subjective:    Labs obtained prior to visit? YES  Reviewed with patient? Yes    DMII-   Patient reports medication compliance Daily  Diabetic diet compliance most of the time  Patient monitors blood sugars regularly  once every two weeks   Reports am fasting sugars range  95   Denies hypoglycemic episodes yes  Denies polyuria, polydipsia, paraesthesia, vision changes? yes  Engaging in daily exercise? Yes: Comment: walking 5 days a week 30-45 min     Diabetic Foot and Eye Exam HM Status   Topic Date Due    Diabetic Foot Care  Never done    Eye Exam  Never done     Hemoglobin A1c   Date Value Ref Range Status   10/31/2022 6.3 (H) 4.2 - 5.6 % Final     Comment:     (NOTE)  HbA1C Interpretive Ranges  <5.7              Normal  5.7 - 6.4         Consider Prediabetes  >6.5              Consider Diabetes     ]  Creatinine, urine random   Date Value Ref Range Status   03/10/2022 87.00 30 - 125 mg/dL Final     Microalbumin/Creat ratio (mg/g creat)   Date Value Ref Range Status   03/10/2022 10 0 - 30 mg/g Final     Key Antihyperglycemic Medications               metFORMIN (GLUCOPHAGE) 1,000 mg tablet (Taking) Take 1 Tablet by mouth two (2) times daily (with meals). Obesity  24 hour diet recall  Bfk: banana  Lunch: beef and broccoli take out  Dinner: 2 hot dogs, no buns  Snacks: apple, V8  Drink: water    ROS:     ROS  As stated in HPI, otherwise all others negative. The problem list was updated as a part of today's visit. Patient Active Problem List   Diagnosis Code    Acne L70.9    Type 2 diabetes mellitus without complication, without long-term current use of insulin (HCC) E11.9    Class 2 obesity due to excess calories without serious comorbidity with body mass index (BMI) of 39.0 to 39.9 in adult E66.09, Z68.39       The PSH, FH were reviewed.       SH:  Social History Tobacco Use    Smoking status: Never    Smokeless tobacco: Never   Substance Use Topics    Alcohol use: Never    Drug use: Never       Medications/Allergies:  Current Outpatient Medications on File Prior to Visit   Medication Sig Dispense Refill    atorvastatin (LIPITOR) 10 mg tablet Take 1 Tablet by mouth daily. 90 Tablet 3    metFORMIN (GLUCOPHAGE) 1,000 mg tablet Take 1 Tablet by mouth two (2) times daily (with meals). 180 Tablet 3    Blood-Glucose Meter monitoring kit Check glucose once a day in the morning prior to breakfast 1 Kit 0    lancets misc Check glucose once a day in the morning prior to breakfast 100 Each 3    glucose blood VI test strips (ASCENSIA AUTODISC VI, ONE TOUCH ULTRA TEST VI) strip Check glucose once a day in the morning prior to breakfast 100 Strip 3    flash glucose sensor (FreeStyle Lamar 2 Sensor) kit Check blood sugar four times a day: AC and HS 6 Kit 2    spironolactone (ALDACTONE) 100 mg tablet        No current facility-administered medications on file prior to visit. Not on File    Objective:  Visit Vitals  /85   Pulse 93   Temp 98.2 °F (36.8 °C) (Temporal)   Resp 18   Ht 5' 4\" (1.626 m)   Wt 227 lb 9.6 oz (103.2 kg)   SpO2 99%   BMI 39.07 kg/m²    Body mass index is 39.07 kg/m². Physical assessment  Physical Exam  Vitals and nursing note reviewed. Eyes:      Conjunctiva/sclera: Conjunctivae normal.      Pupils: Pupils are equal, round, and reactive to light. Cardiovascular:      Rate and Rhythm: Normal rate and regular rhythm. Heart sounds: Normal heart sounds. No murmur heard. No friction rub. No gallop. Pulmonary:      Effort: Pulmonary effort is normal.      Breath sounds: Normal breath sounds. Musculoskeletal:         General: Normal range of motion. Cervical back: Normal range of motion. Skin:     General: Skin is warm and dry. Neurological:      Mental Status: She is alert.          Labwork and Ancillary Studies:    CBC w/Diff  No results found for: WBC, WBCLT, HGB, HGBP, PLT, HGBEXT, PLTEXT, HGBEXT, PLTEXT      Basic Metabolic Profile  Lab Results   Component Value Date/Time    Sodium 139 10/31/2022 08:23 AM    Potassium 5.2 10/31/2022 08:23 AM    Chloride 110 10/31/2022 08:23 AM    CO2 25 10/31/2022 08:23 AM    Anion gap 4 10/31/2022 08:23 AM    Glucose 110 (H) 10/31/2022 08:23 AM    BUN 11 10/31/2022 08:23 AM    Creatinine 0.92 10/31/2022 08:23 AM    BUN/Creatinine ratio 12 10/31/2022 08:23 AM    GFR est AA >60 06/08/2022 03:24 PM    GFR est non-AA >60 06/08/2022 03:24 PM    Calcium 9.4 10/31/2022 08:23 AM        Cholesterol  Lab Results   Component Value Date/Time    Cholesterol, total 183 03/10/2022 03:06 PM    HDL Cholesterol 61 (H) 03/10/2022 03:06 PM    LDL, calculated 105.8 (H) 03/10/2022 03:06 PM    Triglyceride 81 03/10/2022 03:06 PM    CHOL/HDL Ratio 3.0 03/10/2022 03:06 PM           I have discussed the diagnosis with the patient and the intended plan as seen in the above orders. The patient has received an After-Visit Summary and questions were answered concerning future plans. An After Visit Summary was printed and given to the patient. All diagnosis have been discussed with the patient and all of the patient's questions have been answered. Follow-up and Dispositions    Return in about 4 months (around 3/8/2023) for DM, DM foot, 15 min, office only, w/labs prior. Claus Romo, AGNP-BC  [facility] [place] [place]   [facility] [address]  [address]

## 2022-11-08 NOTE — PROGRESS NOTES
Room 7     When asked if patient has seen the (referral) patient states yes, no . Patient referral order has been re-printed and address has been highlighted for patient. When asked if patient has any concerns she would like to address with VIRGILIO Boswell patient states yes I would like to discuss the [place] diet and being insulin resistance due to not losing weight  . Did patient bring someone? No    Did the patient have DME equipment? No     Did you take your medication today? No       1. \"Have you been to the ER, urgent care clinic since your last visit? Hospitalized since your last visit? \" No    2. \"Have you seen or consulted any other health care providers outside of the [address] [place] [address] since your last visit? \" No     3. For patients aged 39-70: Has the patient had a colonoscopy / FIT/ Cologuard? Yes - no Care Gap present      If the patient is female:    4. For patients aged 41-77: Has the patient had a mammogram within the past 2 years? Yes - no Care Gap present      5. For patients aged 21-65: Has the patient had a pap smear?  No Patient has appointment has appointment with VIRGILIO Brown for pap.         3 most recent PHQ Screens 11/8/2022   Little interest or pleasure in doing things Not at all   Feeling down, depressed, irritable, or hopeless Not at all   Total Score PHQ 2 0         Health Maintenance Due   Topic Date Due    Hepatitis C Screening  Never done    COVID-19 Vaccine (1) 01/26/1966    Pneumococcal 0-64 years (1 - PCV) Never done    Foot Exam Q1  Never done    Eye Exam Retinal or Dilated  Never done    Hepatitis B Vaccine (1 of 3 - Risk 3-dose series) Never done    DTaP/Tdap/Td series (1 - Tdap) Never done    Shingrix Vaccine Age 50> (1 of 2) Never done       Learning Assessment 2/10/2022   PRIMARY LEARNER Patient   PRIMARY LANGUAGE ENGLISH   LEARNER PREFERENCE PRIMARY OTHER (COMMENT)   ANSWERED BY Patient    RELATIONSHIP SELF

## 2022-11-08 NOTE — PATIENT INSTRUCTIONS
Learning About the [facility] Diet  What is the Mediterranean diet? The Mediterranean diet is a style of eating rather than a diet plan. It features foods eaten in [place], [place], [place] and [place], and other countries along the [facility]. It emphasizes eating foods like fish, fruits, vegetables, beans, high-fiber breads and whole grains, nuts, and olive oil. This style of eating includes limited red meat, cheese, and sweets. Why choose the Mediterranean diet? A Mediterranean-style diet may improve heart health. It contains more fat than other heart-healthy diets. But the fats are mainly from nuts, unsaturated oils (such as fish oils and olive oil), and certain nut or seed oils (such as canola, soybean, or flaxseed oil). These fats may help protect the heart and blood vessels. How can you get started on the Mediterranean diet? Here are some things you can do to switch to a more Mediterranean way of eating. What to eat  Eat a variety of fruits and vegetables each day, such as grapes, blueberries, tomatoes, broccoli, peppers, figs, olives, spinach, eggplant, beans, lentils, and chickpeas. Eat a variety of whole-grain foods each day, such as oats, brown rice, and whole wheat bread, pasta, and couscous. Eat fish at least 2 times a week. Try tuna, salmon, mackerel, lake trout, herring, or sardines. Eat moderate amounts of low-fat dairy products, such as milk, cheese, or yogurt. Eat moderate amounts of poultry and eggs. Choose healthy (unsaturated) fats, such as nuts, olive oil, and certain nut or seed oils like canola, soybean, and flaxseed. Limit unhealthy (saturated) fats, such as butter, palm oil, and coconut oil. And limit fats found in animal products, such as meat and dairy products made with whole milk. Try to eat red meat only a few times a month in very small amounts. Limit sweets and desserts to only a few times a week. This includes sugar-sweetened drinks like soda.   The Mediterranean diet may also include red wine with your meal--1 glass each day for women and up to 2 glasses a day for men. Tips for eating at home  Use herbs, spices, garlic, lemon zest, and citrus juice instead of salt to add flavor to foods. Add avocado slices to your sandwich instead of angulo. Have fish for lunch or dinner instead of red meat. Brush the fish with olive oil, and broil or grill it. Sprinkle your salad with seeds or nuts instead of cheese. Cook with olive or canola oil instead of butter or oils that are high in saturated fat. Switch from 2% milk or whole milk to 1% or fat-free milk. Dip raw vegetables in a vinaigrette dressing or hummus instead of dips made from mayonnaise or sour cream.  Have a piece of fruit for dessert instead of a piece of cake. Try baked apples, or have some dried fruit. Tips for eating out  Try broiled, grilled, baked, or poached fish instead of having it fried or breaded. Ask your  to have your meals prepared with olive oil instead of butter. Order dishes made with marinara sauce or sauces made from olive oil. Avoid sauces made from cream or mayonnaise. Choose whole-grain breads, whole wheat pasta and pizza crust, brown rice, beans, and lentils. Cut back on butter or margarine on bread. Instead, you can dip your bread in a small amount of olive oil. Ask for a side salad or grilled vegetables instead of french fries or chips. Where can you learn more? Go to http://www.vaughan.com/  Enter O407 in the search box to learn more about \"Learning About the Mediterranean Diet. \"  Current as of: August 22, 2019               Content Version: 12.6  © 5659-2748 [employer]. Care instructions adapted under license by [employer] (which disclaims liability or warranty for this information).  If you have questions about a medical condition or this instruction, always ask your healthcare professional. Bree Posey disclaims any warranty or liability for your use of this information.

## 2022-12-07 ENCOUNTER — TELEPHONE (OUTPATIENT)
Dept: FAMILY MEDICINE CLINIC | Age: 57
End: 2022-12-07

## 2022-12-07 NOTE — TELEPHONE ENCOUNTER
Called mrs. Puri Fore as she requested. She wanted to know what I planned to do for her chest wall pain. Advised her I cannot tell until she is seen and a physical assessment can be performed. She verbalized understanding.

## 2023-01-12 ENCOUNTER — OFFICE VISIT (OUTPATIENT)
Dept: FAMILY MEDICINE CLINIC | Age: 58
End: 2023-01-12
Payer: MEDICARE

## 2023-01-12 VITALS
HEIGHT: 64 IN | TEMPERATURE: 98.1 F | BODY MASS INDEX: 38.93 KG/M2 | HEART RATE: 98 BPM | OXYGEN SATURATION: 100 % | DIASTOLIC BLOOD PRESSURE: 78 MMHG | RESPIRATION RATE: 18 BRPM | WEIGHT: 228 LBS | SYSTOLIC BLOOD PRESSURE: 107 MMHG

## 2023-01-12 DIAGNOSIS — E66.09 CLASS 2 OBESITY DUE TO EXCESS CALORIES WITHOUT SERIOUS COMORBIDITY WITH BODY MASS INDEX (BMI) OF 39.0 TO 39.9 IN ADULT: ICD-10-CM

## 2023-01-12 DIAGNOSIS — K21.9 GASTROESOPHAGEAL REFLUX DISEASE, UNSPECIFIED WHETHER ESOPHAGITIS PRESENT: Primary | ICD-10-CM

## 2023-01-12 PROCEDURE — G9899 SCRN MAM PERF RSLTS DOC: HCPCS | Performed by: NURSE PRACTITIONER

## 2023-01-12 PROCEDURE — G8417 CALC BMI ABV UP PARAM F/U: HCPCS | Performed by: NURSE PRACTITIONER

## 2023-01-12 PROCEDURE — 3017F COLORECTAL CA SCREEN DOC REV: CPT | Performed by: NURSE PRACTITIONER

## 2023-01-12 PROCEDURE — G8432 DEP SCR NOT DOC, RNG: HCPCS | Performed by: NURSE PRACTITIONER

## 2023-01-12 PROCEDURE — 99213 OFFICE O/P EST LOW 20 MIN: CPT | Performed by: NURSE PRACTITIONER

## 2023-01-12 PROCEDURE — G8427 DOCREV CUR MEDS BY ELIG CLIN: HCPCS | Performed by: NURSE PRACTITIONER

## 2023-01-12 RX ORDER — FAMOTIDINE 40 MG/1
40 TABLET, FILM COATED ORAL DAILY
Qty: 90 TABLET | Refills: 3 | Status: SHIPPED | OUTPATIENT
Start: 2023-01-12

## 2023-01-12 NOTE — PROGRESS NOTES
Cory               [address]               [phone]      Eduar Mcdonald is a 62 y.o. female and presents with Abdominal Pain       Assessment/Plan:    Diagnoses and all orders for this visit:    1. Gastroesophageal reflux disease, unspecified whether esophagitis present  -     famotidine (PEPCID) 40 mg tablet; Take 1 Tablet by mouth daily. Signs and symptoms most consistent with GERD, will try pepcid  Advised if the problem continues refer to GI, she is in agreement with this  Reviewed with her signs and symptoms of possible MI to be aware of  2. Class 2 obesity due to excess calories without serious comorbidity with body mass index (BMI) of 39.0 to 39.9 in adult  She is interested in weight loss medications, she is going to call her insurance and find out what is covered and let me know via my chart    Follow-up and Dispositions    Return for keep previously scheduled follow up. Health Maintenance:   Health Maintenance   Topic Date Due    Hepatitis C Screening  Never done    COVID-19 Vaccine (1) 01/26/1966    Pneumococcal 0-64 years (1 - PCV) Never done    Foot Exam Q1  Never done    Eye Exam Retinal or Dilated  Never done    Hepatitis B Vaccine (1 of 3 - Risk 3-dose series) Never done    DTaP/Tdap/Td series (1 - Tdap) Never done    Shingles Vaccine (1 of 2) Never done    Diabetic Alb to Cr ratio (uACR) test  03/10/2023    Lipid Screen  03/10/2023    Medicare Yearly Exam  06/16/2023    GFR test (Diabetes, CKD 3-4, OR last GFR 15-59)  10/31/2023    A1C test (Diabetic or Prediabetic)  10/31/2023    Depression Screen  11/08/2023    Breast Cancer Screen Mammogram  09/27/2024    Colorectal Cancer Screening Combo  03/25/2025    Cervical cancer screen  08/18/2025    Flu Vaccine  Completed        Subjective:    Labs obtained prior to visit? NO  Reviewed with patient?  Not applicable    Chest pain  Gets a \"cramp\" on the left side of her chest  Happens at rest or with activity, has happened in her sleep  Is not brought on by activity  The problem lasts about a second, and happens once or twice a year  Happens when lying down or sitting up  Denies nausea, radiation to neck/jaw/ears/shoulder/back  Does not recall if food makes a difference, however, she cannot lie down after eating because she feels like the food will come back up    Offered GI consult, she wants to wait for now    Obesity  She is interested in weight loss assistance with medications  She is interested in some of the newer GLP-1 and GLP- GIP's  She will call her insurance and find out what is covered    ROS:     ROS  As stated in HPI, otherwise all others negative. The problem list was updated as a part of today's visit. Patient Active Problem List   Diagnosis Code    Acne L70.9    Type 2 diabetes mellitus without complication, without long-term current use of insulin (HCC) E11.9    Class 2 obesity due to excess calories without serious comorbidity with body mass index (BMI) of 39.0 to 39.9 in adult E66.09, Z68.39       The PSH, FH were reviewed. SH:  Social History     Tobacco Use    Smoking status: Never    Smokeless tobacco: Never   Substance Use Topics    Alcohol use: Never    Drug use: Never       Medications/Allergies:  Current Outpatient Medications on File Prior to Visit   Medication Sig Dispense Refill    atorvastatin (LIPITOR) 10 mg tablet Take 1 Tablet by mouth daily. 90 Tablet 3    metFORMIN (GLUCOPHAGE) 1,000 mg tablet Take 1 Tablet by mouth two (2) times daily (with meals).  180 Tablet 3    Blood-Glucose Meter monitoring kit Check glucose once a day in the morning prior to breakfast 1 Kit 0    lancets misc Check glucose once a day in the morning prior to breakfast 100 Each 3    glucose blood VI test strips (ASCENSIA AUTODISC VI, ONE TOUCH ULTRA TEST VI) strip Check glucose once a day in the morning prior to breakfast 100 Strip 3    spironolactone (ALDACTONE) 100 mg tablet       [DISCONTINUED] flash glucose sensor (FreeStyle Lamar 2 Sensor) kit Check blood sugar four times a day: AC and HS 6 Kit 2     No current facility-administered medications on file prior to visit. Not on File    Objective:  Visit Vitals  /78   Pulse 98   Temp 98.1 °F (36.7 °C) (Temporal)   Resp 18   Ht 5' 4\" (1.626 m)   Wt 228 lb (103.4 kg)   SpO2 100%   BMI 39.14 kg/m²    Body mass index is 39.14 kg/m². Physical assessment  Physical Exam      Labwork and Ancillary Studies:    CBC w/Diff  No results found for: WBC, WBCLT, HGB, HGBP, PLT, HGBEXT, PLTEXT, HGBEXT, PLTEXT      Basic Metabolic Profile  Lab Results   Component Value Date/Time    Sodium 139 10/31/2022 08:23 AM    Potassium 5.2 10/31/2022 08:23 AM    Chloride 110 10/31/2022 08:23 AM    CO2 25 10/31/2022 08:23 AM    Anion gap 4 10/31/2022 08:23 AM    Glucose 110 (H) 10/31/2022 08:23 AM    BUN 11 10/31/2022 08:23 AM    Creatinine 0.92 10/31/2022 08:23 AM    BUN/Creatinine ratio 12 10/31/2022 08:23 AM    GFR est AA >60 06/08/2022 03:24 PM    GFR est non-AA >60 06/08/2022 03:24 PM    Calcium 9.4 10/31/2022 08:23 AM        Cholesterol  Lab Results   Component Value Date/Time    Cholesterol, total 183 03/10/2022 03:06 PM    HDL Cholesterol 61 (H) 03/10/2022 03:06 PM    LDL, calculated 105.8 (H) 03/10/2022 03:06 PM    Triglyceride 81 03/10/2022 03:06 PM    CHOL/HDL Ratio 3.0 03/10/2022 03:06 PM           I have discussed the diagnosis with the patient and the intended plan as seen in the above orders. The patient has received an After-Visit Summary and questions were answered concerning future plans. An After Visit Summary was printed and given to the patient. All diagnosis have been discussed with the patient and all of the patient's questions have been answered. Follow-up and Dispositions    Return for keep previously scheduled follow up.            Harman Boast, [facility]  [facility]   [address]  [id number]

## 2023-01-12 NOTE — PROGRESS NOTES
Room 7     When asked if patient has any concerns she would like to address with VIRGILIO Boswell patient states yes I have a cramp on left upper chest area that goes and I would like to discuss weight loss. Did patient bring someone? No    Did the patient have DME equipment? No     Did you take your medication today? Yes       1. \"Have you been to the ER, urgent care clinic since your last visit? Hospitalized since your last visit? \" No    2. \"Have you seen or consulted any other health care providers outside of the [address] [place] [address] since your last visit? \" No     3. For patients aged 39-70: Has the patient had a colonoscopy / FIT/ Cologuard? Yes - no Care Gap present      If the patient is female:    4. For patients aged 41-77: Has the patient had a mammogram within the past 2 years? Yes - no Care Gap present      5. For patients aged 21-65: Has the patient had a pap smear?  Yes - no Care Gap present        3 most recent PHQ Screens 1/12/2023   Little interest or pleasure in doing things Not at all   Feeling down, depressed, irritable, or hopeless Not at all   Total Score PHQ 2 0         Health Maintenance Due   Topic Date Due    Hepatitis C Screening  Never done    COVID-19 Vaccine (1) 01/26/1966    Pneumococcal 0-64 years (1 - PCV) Never done    Foot Exam Q1  Never done    Eye Exam Retinal or Dilated  Never done    Hepatitis B Vaccine (1 of 3 - Risk 3-dose series) Never done    DTaP/Tdap/Td series (1 - Tdap) Never done    Shingles Vaccine (1 of 2) Never done       Learning Assessment 2/10/2022   PRIMARY LEARNER Patient   PRIMARY LANGUAGE ENGLISH   LEARNER PREFERENCE PRIMARY OTHER (COMMENT)   ANSWERED BY Patient    RELATIONSHIP SELF

## 2023-01-19 ENCOUNTER — PATIENT MESSAGE (OUTPATIENT)
Dept: FAMILY MEDICINE CLINIC | Age: 58
End: 2023-01-19

## 2023-01-19 DIAGNOSIS — E11.9 TYPE 2 DIABETES MELLITUS WITHOUT COMPLICATION, WITHOUT LONG-TERM CURRENT USE OF INSULIN (HCC): Primary | ICD-10-CM

## 2023-01-23 RX ORDER — TIRZEPATIDE 5 MG/.5ML
5 INJECTION, SOLUTION SUBCUTANEOUS
Qty: 8 ADJUSTABLE DOSE PRE-FILLED PEN SYRINGE | Refills: 0 | Status: SHIPPED | OUTPATIENT
Start: 2023-02-13

## 2023-01-23 RX ORDER — TIRZEPATIDE 2.5 MG/.5ML
2.5 INJECTION, SOLUTION SUBCUTANEOUS
Qty: 4 ADJUSTABLE DOSE PRE-FILLED PEN SYRINGE | Refills: 0 | Status: SHIPPED | OUTPATIENT
Start: 2023-01-23

## 2023-01-23 NOTE — TELEPHONE ENCOUNTER
Called patient to discuss my chart message. Reviewed with her starting mounjaro, side effects and actions of drug. She is in [facility], Rx sent to pharmacy of choice.

## 2023-01-23 NOTE — TELEPHONE ENCOUNTER
From: Lenin Egan  To: Markus Sanford NP  Sent: 1/19/2023 3:32 AM EST  Subject: Our conversation about Olivia Digna and Victoza for Weight Loss    Hi NP Bindu Betancourt,  I called my insurance company ([employer]); they said that all of the medications (Ozempic, [address]) are covered at tier 3, which means it will cost $47 for a 30-day supply, $141 for a 100-day supply, and $131 for a 100-day supply through the mail order pharmacy. I can handle this cost and would like to do the $131 through mail order. Now, the question is which medication do you think is best for me? My AIC is at 6.2; will the new medication dip my numbers to low or will it be safe to take it with Metformin? Please get back to me as soon as you can and thank you so much.  Melanie Friend

## 2023-02-24 RX ORDER — ATORVASTATIN CALCIUM 10 MG/1
TABLET, FILM COATED ORAL
Qty: 90 TABLET | Refills: 3 | Status: SHIPPED | OUTPATIENT
Start: 2023-02-24

## 2023-03-23 DIAGNOSIS — E11.9 TYPE 2 DIABETES MELLITUS WITHOUT COMPLICATION, WITHOUT LONG-TERM CURRENT USE OF INSULIN (HCC): Primary | ICD-10-CM

## 2023-03-23 RX ORDER — TIRZEPATIDE 5 MG/.5ML
5 INJECTION, SOLUTION SUBCUTANEOUS
COMMUNITY
Start: 2023-02-13 | End: 2023-03-23 | Stop reason: SDUPTHER

## 2023-03-23 RX ORDER — TIRZEPATIDE 5 MG/.5ML
5 INJECTION, SOLUTION SUBCUTANEOUS
Qty: 12 ADJUSTABLE DOSE PRE-FILLED PEN SYRINGE | Refills: 0 | Status: SHIPPED | OUTPATIENT
Start: 2023-03-23

## 2023-03-27 DIAGNOSIS — E11.9 TYPE 2 DIABETES MELLITUS WITHOUT COMPLICATION, WITHOUT LONG-TERM CURRENT USE OF INSULIN (HCC): Primary | ICD-10-CM

## 2023-03-28 ENCOUNTER — HOSPITAL ENCOUNTER (OUTPATIENT)
Facility: HOSPITAL | Age: 58
Setting detail: SPECIMEN
Discharge: HOME | End: 2023-03-31
Payer: MEDICARE

## 2023-03-28 LAB
CREAT UR-MCNC: 403 MG/DL (ref 30–125)
EST. AVERAGE GLUCOSE BLD GHB EST-MCNC: 126 MG/DL
HBA1C MFR BLD: 6 % (ref 4.2–5.6)
MICROALBUMIN UR-MCNC: 2.95 MG/DL (ref 0–3)
MICROALBUMIN/CREAT UR-RTO: 7 MG/G (ref 0–30)

## 2023-03-28 PROCEDURE — 80061 LIPID PANEL: CPT

## 2023-03-28 PROCEDURE — 80053 COMPREHEN METABOLIC PANEL: CPT

## 2023-03-28 PROCEDURE — 83036 HEMOGLOBIN GLYCOSYLATED A1C: CPT

## 2023-03-28 PROCEDURE — 36415 COLL VENOUS BLD VENIPUNCTURE: CPT

## 2023-03-28 PROCEDURE — 82043 UR ALBUMIN QUANTITATIVE: CPT

## 2023-03-29 LAB
ALBUMIN SERPL-MCNC: 4.3 G/DL (ref 3.4–5)
ALBUMIN/GLOB SERPL: 1.2 (ref 0.8–1.7)
ALP SERPL-CCNC: 92 U/L (ref 45–117)
ALT SERPL-CCNC: 30 U/L (ref 13–56)
ANION GAP SERPL CALC-SCNC: 5 MMOL/L (ref 3–18)
AST SERPL-CCNC: 25 U/L (ref 10–38)
BILIRUB SERPL-MCNC: 0.4 MG/DL (ref 0.2–1)
BUN SERPL-MCNC: 13 MG/DL (ref 7–18)
BUN/CREAT SERPL: 11 (ref 12–20)
CALCIUM SERPL-MCNC: 9.8 MG/DL (ref 8.5–10.1)
CHLORIDE SERPL-SCNC: 108 MMOL/L (ref 100–111)
CHOLEST SERPL-MCNC: 102 MG/DL
CO2 SERPL-SCNC: 24 MMOL/L (ref 21–32)
CREAT SERPL-MCNC: 1.21 MG/DL (ref 0.6–1.3)
GLOBULIN SER CALC-MCNC: 3.5 G/DL (ref 2–4)
GLUCOSE SERPL-MCNC: 109 MG/DL (ref 74–99)
HDLC SERPL-MCNC: 49 MG/DL (ref 40–60)
HDLC SERPL: 2.1 (ref 0–5)
LDLC SERPL CALC-MCNC: 37.6 MG/DL (ref 0–100)
LIPID PANEL: NORMAL
POTASSIUM SERPL-SCNC: 4.8 MMOL/L (ref 3.5–5.5)
PROT SERPL-MCNC: 7.8 G/DL (ref 6.4–8.2)
SODIUM SERPL-SCNC: 137 MMOL/L (ref 136–145)
TRIGL SERPL-MCNC: 77 MG/DL
VLDLC SERPL CALC-MCNC: 15.4 MG/DL

## 2023-04-04 ENCOUNTER — OFFICE VISIT (OUTPATIENT)
Facility: CLINIC | Age: 58
End: 2023-04-04
Payer: MEDICARE

## 2023-04-04 VITALS
OXYGEN SATURATION: 96 % | BODY MASS INDEX: 35.68 KG/M2 | TEMPERATURE: 98 F | RESPIRATION RATE: 18 BRPM | HEIGHT: 64 IN | HEART RATE: 101 BPM | DIASTOLIC BLOOD PRESSURE: 74 MMHG | SYSTOLIC BLOOD PRESSURE: 121 MMHG | WEIGHT: 209 LBS

## 2023-04-04 DIAGNOSIS — Z11.59 NEED FOR HEPATITIS C SCREENING TEST: ICD-10-CM

## 2023-04-04 DIAGNOSIS — E11.9 TYPE 2 DIABETES MELLITUS WITHOUT COMPLICATION, WITHOUT LONG-TERM CURRENT USE OF INSULIN (HCC): Primary | ICD-10-CM

## 2023-04-04 PROCEDURE — 3044F HG A1C LEVEL LT 7.0%: CPT | Performed by: NURSE PRACTITIONER

## 2023-04-04 PROCEDURE — 1036F TOBACCO NON-USER: CPT | Performed by: NURSE PRACTITIONER

## 2023-04-04 PROCEDURE — 99213 OFFICE O/P EST LOW 20 MIN: CPT | Performed by: NURSE PRACTITIONER

## 2023-04-04 PROCEDURE — 2022F DILAT RTA XM EVC RTNOPTHY: CPT | Performed by: NURSE PRACTITIONER

## 2023-04-04 PROCEDURE — G8427 DOCREV CUR MEDS BY ELIG CLIN: HCPCS | Performed by: NURSE PRACTITIONER

## 2023-04-04 PROCEDURE — 3017F COLORECTAL CA SCREEN DOC REV: CPT | Performed by: NURSE PRACTITIONER

## 2023-04-04 PROCEDURE — G8417 CALC BMI ABV UP PARAM F/U: HCPCS | Performed by: NURSE PRACTITIONER

## 2023-04-04 SDOH — ECONOMIC STABILITY: INCOME INSECURITY: IN THE LAST 12 MONTHS, WAS THERE A TIME WHEN YOU WERE NOT ABLE TO PAY THE MORTGAGE OR RENT ON TIME?: NO

## 2023-04-04 SDOH — ECONOMIC STABILITY: TRANSPORTATION INSECURITY
IN THE PAST 12 MONTHS, HAS LACK OF TRANSPORTATION KEPT YOU FROM MEETINGS, WORK, OR FROM GETTING THINGS NEEDED FOR DAILY LIVING?: NO

## 2023-04-04 SDOH — ECONOMIC STABILITY: FOOD INSECURITY: WITHIN THE PAST 12 MONTHS, YOU WORRIED THAT YOUR FOOD WOULD RUN OUT BEFORE YOU GOT MONEY TO BUY MORE.: NEVER TRUE

## 2023-04-04 SDOH — ECONOMIC STABILITY: FOOD INSECURITY: WITHIN THE PAST 12 MONTHS, THE FOOD YOU BOUGHT JUST DIDN'T LAST AND YOU DIDN'T HAVE MONEY TO GET MORE.: NEVER TRUE

## 2023-04-04 SDOH — ECONOMIC STABILITY: HOUSING INSECURITY
IN THE LAST 12 MONTHS, WAS THERE A TIME WHEN YOU DID NOT HAVE A STEADY PLACE TO SLEEP OR SLEPT IN A SHELTER (INCLUDING NOW)?: NO

## 2023-04-04 SDOH — ECONOMIC STABILITY: TRANSPORTATION INSECURITY
IN THE PAST 12 MONTHS, HAS THE LACK OF TRANSPORTATION KEPT YOU FROM MEDICAL APPOINTMENTS OR FROM GETTING MEDICATIONS?: NO

## 2023-04-04 ASSESSMENT — PATIENT HEALTH QUESTIONNAIRE - PHQ9
1. LITTLE INTEREST OR PLEASURE IN DOING THINGS: 0
2. FEELING DOWN, DEPRESSED OR HOPELESS: 0
SUM OF ALL RESPONSES TO PHQ QUESTIONS 1-9: 0
SUM OF ALL RESPONSES TO PHQ9 QUESTIONS 1 & 2: 0
SUM OF ALL RESPONSES TO PHQ QUESTIONS 1-9: 0

## 2023-04-04 ASSESSMENT — SOCIAL DETERMINANTS OF HEALTH (SDOH): HOW HARD IS IT FOR YOU TO PAY FOR THE VERY BASICS LIKE FOOD, HOUSING, MEDICAL CARE, AND HEATING?: NOT HARD AT ALL

## 2023-04-04 NOTE — PROGRESS NOTES
Room 8     When asked if patient has any concerns she would like to address with JERROD Arias patient states no . Did patient bring someone? No     Did the patient have DME equipment? No     Did you take your medication today? Yes       1. \"Have you been to the ER, urgent care clinic since your last visit? Hospitalized since your last visit? \" No     2. \"Have you seen or consulted any other health care providers outside of the [address] [place] [address] since your last visit? \" No     3. For patients aged 39-70: Has the patient had a colonoscopy / FIT/ Cologuard? Yes      If the patient is female:    4. For patients aged 41-77: Has the patient had a mammogram within the past 2 years? Yes       5. For patients aged 21-65: Has the patient had a pap smear? {Cancer Care Gap present? Yes       PHQ-9  4/4/2023   Little interest or pleasure in doing things 0   Little interest or pleasure in doing things -   Feeling down, depressed, or hopeless 0   PHQ-2 Score 0   Total Score PHQ 2 -   PHQ-9 Total Score 0          Health Maintenance Due   Topic Date Due    Pneumococcal 0-64 years Vaccine (1 - PCV) Never done    Diabetic foot exam  Never done    HIV screen  Never done    Diabetic retinal exam  Never done    Hepatitis C screen  Never done    Hepatitis B vaccine (1 of 3 - Risk 3-dose series) Never done    Shingles vaccine (1 of 2) Never done         Who is the primary learner? Patient    What is the preferred language for health care of the primary learner? ENGLISH    How does the primary learner prefer to learn new concepts?  OTHER (COMMENT) Patient states All   Answered By patient    Relationship to Learner SELF
SubCUTAneous             ROS:     Review of Systems  As stated in HPI, otherwise all others negative. The problem list was updated as a part of today's visit. Patient Active Problem List   Diagnosis    Type 2 diabetes mellitus without complication, without long-term current use of insulin (HCC)    Acne    Class 2 obesity due to excess calories without serious comorbidity with body mass index (BMI) of 39.0 to 39.9 in adult       [facility], FH were reviewed. SH:  Social History     Tobacco Use    Smoking status: Never    Smokeless tobacco: Never   Vaping Use    Vaping Use: Never used   Substance Use Topics    Alcohol use: Never    Drug use: Never       Medications/Allergies:  Current Outpatient Medications on File Prior to Visit   Medication Sig Dispense Refill    MOUNJARO 5 MG/0.5ML SOPN SC injection Inject 0.5 mLs into the skin every 7 days 12 Adjustable Dose Pre-filled Pen Syringe 0    atorvastatin (LIPITOR) 10 MG tablet TAKE 1 TABLET BY MOUTH  DAILY 90 tablet 3    Lancets MISC Check glucose once a day in the morning prior to breakfast      spironolactone (ALDACTONE) 100 MG tablet ceived the following from [facility]: Outside name: spironolactone (ALDACTONE) 100 mg tablet       No current facility-administered medications on file prior to visit. No Known Allergies    Objective:  /74 Comment: feet flat on floor  Pulse (!) 101   Temp 98 °F (36.7 °C) (Temporal)   Resp 18   Ht 5' 4\" (1.626 m)   Wt 209 lb (94.8 kg)   SpO2 96%   BMI 35.87 kg/m²  Body mass index is 35.87 kg/m². Physical assessment  Physical Exam  Vitals and nursing note reviewed. Constitutional:       Appearance: Normal appearance. HENT:      Head: Normocephalic and atraumatic. Eyes:      Pupils: Pupils are equal, round, and reactive to light. Cardiovascular:      Rate and Rhythm: Normal rate and regular rhythm. Heart sounds: Normal heart sounds. No murmur heard. No friction rub. No gallop.

## 2023-05-04 ENCOUNTER — PATIENT MESSAGE (OUTPATIENT)
Facility: CLINIC | Age: 58
End: 2023-05-04

## 2023-05-04 DIAGNOSIS — E11.9 TYPE 2 DIABETES MELLITUS WITHOUT COMPLICATION, WITHOUT LONG-TERM CURRENT USE OF INSULIN (HCC): Primary | ICD-10-CM

## 2023-05-04 RX ORDER — TIRZEPATIDE 7.5 MG/.5ML
7.5 INJECTION, SOLUTION SUBCUTANEOUS WEEKLY
Qty: 12 ADJUSTABLE DOSE PRE-FILLED PEN SYRINGE | Refills: 1 | Status: SHIPPED | OUTPATIENT
Start: 2023-05-04

## 2023-05-04 NOTE — TELEPHONE ENCOUNTER
From: Oc Monique  To: Kelly Sheehan  Sent: 5/4/2023 2:11 AM EDT  Subject: Need new prescription for Mounjaro 7.5 mg    Hi NP Edward Sam,  It's time for a new prescription for [facility]. I'm on the 5 mg now and I'm having some break through cravings/hunger. Would you please give me a new prescription for the 7.5 mg . . . order the 84-day supply through [employer], it'll only cost me $10. It's working because I fit into a pair of jeans that I haven't worn in 5 years; Jung Camarena!!! Thank you so much for helping me with this long-overdue journey.

## 2023-07-06 DIAGNOSIS — E11.9 TYPE 2 DIABETES MELLITUS WITHOUT COMPLICATION, WITHOUT LONG-TERM CURRENT USE OF INSULIN (HCC): ICD-10-CM

## 2023-07-18 DIAGNOSIS — E11.9 TYPE 2 DIABETES MELLITUS WITHOUT COMPLICATION, WITHOUT LONG-TERM CURRENT USE OF INSULIN (HCC): ICD-10-CM

## 2023-07-18 NOTE — TELEPHONE ENCOUNTER
Patient stated her [facility] needs to be increased to 10 mg - I could not do it. The original prescription is attached to this communication.

## 2023-07-19 RX ORDER — TIRZEPATIDE 10 MG/.5ML
10 INJECTION, SOLUTION SUBCUTANEOUS WEEKLY
Qty: 12 ADJUSTABLE DOSE PRE-FILLED PEN SYRINGE | Refills: 1 | Status: SHIPPED | OUTPATIENT
Start: 2023-07-19

## 2023-07-19 RX ORDER — TIRZEPATIDE 7.5 MG/.5ML
7.5 INJECTION, SOLUTION SUBCUTANEOUS WEEKLY
Qty: 12 ADJUSTABLE DOSE PRE-FILLED PEN SYRINGE | Refills: 1 | OUTPATIENT
Start: 2023-07-19

## 2023-08-24 RX ORDER — FAMOTIDINE 40 MG/1
TABLET, FILM COATED ORAL
Qty: 100 TABLET | Refills: 1 | Status: SHIPPED | OUTPATIENT
Start: 2023-08-24

## 2023-09-06 ENCOUNTER — HOSPITAL ENCOUNTER (OUTPATIENT)
Facility: HOSPITAL | Age: 58
Setting detail: SPECIMEN
Discharge: HOME | End: 2023-09-09
Payer: MEDICARE

## 2023-09-06 LAB
ALBUMIN SERPL-MCNC: 4 G/DL (ref 3.4–5)
ALBUMIN/GLOB SERPL: 1.2 (ref 0.8–1.7)
ALP SERPL-CCNC: 96 U/L (ref 45–117)
ALT SERPL-CCNC: 22 U/L (ref 13–56)
ANION GAP SERPL CALC-SCNC: 5 MMOL/L (ref 3–18)
AST SERPL-CCNC: 16 U/L (ref 10–38)
BILIRUB SERPL-MCNC: 0.3 MG/DL (ref 0.2–1)
BUN SERPL-MCNC: 9 MG/DL (ref 7–18)
BUN/CREAT SERPL: 8 (ref 12–20)
CALCIUM SERPL-MCNC: 9.7 MG/DL (ref 8.5–10.1)
CHLORIDE SERPL-SCNC: 109 MMOL/L (ref 100–111)
CHOLEST SERPL-MCNC: 115 MG/DL
CO2 SERPL-SCNC: 25 MMOL/L (ref 21–32)
CREAT SERPL-MCNC: 1.09 MG/DL (ref 0.6–1.3)
EST. AVERAGE GLUCOSE BLD GHB EST-MCNC: 114 MG/DL
GLOBULIN SER CALC-MCNC: 3.3 G/DL (ref 2–4)
GLUCOSE SERPL-MCNC: 92 MG/DL (ref 74–99)
HBA1C MFR BLD: 5.6 % (ref 4.2–5.6)
HDLC SERPL-MCNC: 60 MG/DL (ref 40–60)
HDLC SERPL: 1.9 (ref 0–5)
LDLC SERPL CALC-MCNC: 36.6 MG/DL (ref 0–100)
LIPID PANEL: NORMAL
POTASSIUM SERPL-SCNC: 4.9 MMOL/L (ref 3.5–5.5)
PROT SERPL-MCNC: 7.3 G/DL (ref 6.4–8.2)
SODIUM SERPL-SCNC: 139 MMOL/L (ref 136–145)
TRIGL SERPL-MCNC: 92 MG/DL
VLDLC SERPL CALC-MCNC: 18.4 MG/DL

## 2023-09-06 PROCEDURE — 80061 LIPID PANEL: CPT

## 2023-09-06 PROCEDURE — 83036 HEMOGLOBIN GLYCOSYLATED A1C: CPT

## 2023-09-06 PROCEDURE — 86803 HEPATITIS C AB TEST: CPT

## 2023-09-06 PROCEDURE — 36415 COLL VENOUS BLD VENIPUNCTURE: CPT

## 2023-09-06 PROCEDURE — 80053 COMPREHEN METABOLIC PANEL: CPT

## 2023-09-08 LAB
HCV AB SERPL QL IA: NORMAL
HCV IGG SERPL QL IA: NON REACTIVE S/CO RATIO

## 2023-09-13 ENCOUNTER — OFFICE VISIT (OUTPATIENT)
Facility: CLINIC | Age: 58
End: 2023-09-13
Payer: MEDICARE

## 2023-09-13 VITALS
HEIGHT: 64 IN | RESPIRATION RATE: 18 BRPM | DIASTOLIC BLOOD PRESSURE: 69 MMHG | HEART RATE: 101 BPM | TEMPERATURE: 98 F | BODY MASS INDEX: 32.1 KG/M2 | OXYGEN SATURATION: 100 % | SYSTOLIC BLOOD PRESSURE: 103 MMHG | WEIGHT: 188 LBS

## 2023-09-13 DIAGNOSIS — Z00.00 MEDICARE ANNUAL WELLNESS VISIT, SUBSEQUENT: Primary | ICD-10-CM

## 2023-09-13 DIAGNOSIS — Z71.89 ADVANCED CARE PLANNING/COUNSELING DISCUSSION: ICD-10-CM

## 2023-09-13 DIAGNOSIS — E11.9 TYPE 2 DIABETES MELLITUS WITHOUT COMPLICATION, WITHOUT LONG-TERM CURRENT USE OF INSULIN (HCC): ICD-10-CM

## 2023-09-13 PROCEDURE — 3044F HG A1C LEVEL LT 7.0%: CPT | Performed by: NURSE PRACTITIONER

## 2023-09-13 PROCEDURE — 3017F COLORECTAL CA SCREEN DOC REV: CPT | Performed by: NURSE PRACTITIONER

## 2023-09-13 PROCEDURE — G0439 PPPS, SUBSEQ VISIT: HCPCS | Performed by: NURSE PRACTITIONER

## 2023-09-13 ASSESSMENT — PATIENT HEALTH QUESTIONNAIRE - PHQ9
SUM OF ALL RESPONSES TO PHQ QUESTIONS 1-9: 0
SUM OF ALL RESPONSES TO PHQ9 QUESTIONS 1 & 2: 0
SUM OF ALL RESPONSES TO PHQ QUESTIONS 1-9: 0
2. FEELING DOWN, DEPRESSED OR HOPELESS: 0
1. LITTLE INTEREST OR PLEASURE IN DOING THINGS: 0
SUM OF ALL RESPONSES TO PHQ QUESTIONS 1-9: 0
SUM OF ALL RESPONSES TO PHQ QUESTIONS 1-9: 0

## 2023-09-13 ASSESSMENT — LIFESTYLE VARIABLES
HOW OFTEN DO YOU HAVE A DRINK CONTAINING ALCOHOL: NEVER
HOW MANY STANDARD DRINKS CONTAINING ALCOHOL DO YOU HAVE ON A TYPICAL DAY: PATIENT DOES NOT DRINK

## 2023-09-13 NOTE — PROGRESS NOTES
Room 8    Jenn Yu had concerns including Follow-up Chronic Condition and Diabetes. for today's visit . When asked if patient has any concerns she would like to address with JERROD Rapp . Patient states I have no concerns. JERROD Rapp has been notified  of patient concerns . Did patient bring someone? no    Did the patient have DME equipment? No     Did you take your medication today? Yes       1. \"Have you been to the ER, urgent care clinic since your last visit? Hospitalized since your last visit? \" . NO    2. \"Have you seen or consulted any other health care providers outside of the [address] [place] [address] since your last visit? \" No     3. For patients aged 43-73: Has the patient had a colonoscopy / FIT/ Cologuard? Yes      If the patient is female:    4. For patients aged 43-66: Has the patient had a mammogram within the past 2 years? yes      5. For patients aged 21-65: Has the patient had a pap smear? {Cancer Care Gap present?  Yes         No data to display                       9/13/2023     1:04 PM   PHQ-9    Little interest or pleasure in doing things 0   Feeling down, depressed, or hopeless 0   PHQ-2 Score 0   PHQ-9 Total Score 0          Health Maintenance Due   Topic Date Due    Hepatitis B vaccine (1 of 3 - 3-dose series) Never done    Pneumococcal 0-64 years Vaccine (1 - PCV) Never done    Diabetic foot exam  Never done    Diabetic retinal exam  Never done    Shingles vaccine (1 of 2) Never done    COVID-19 Vaccine (6 - Moderna series) 12/05/2022    Annual Wellness Visit (AWV)  06/16/2023    Flu vaccine (1) 08/01/2023

## 2023-09-13 NOTE — PATIENT INSTRUCTIONS
The [employer] Data on Aging online. You need 6007-1884 mg of calcium and 5704-1483 IU of vitamin D per day. It is possible to meet your calcium requirement with diet alone, but a vitamin D supplement is usually necessary to meet this goal.  When exposed to the sun, use a sunscreen that protects against both UVA and UVB radiation with an SPF of 30 or greater. Reapply every 2 to 3 hours or after sweating, drying off with a towel, or swimming. Always wear a seat belt when traveling in a car. Always wear a helmet when riding a bicycle or motorcycle.

## 2023-09-13 NOTE — ACP (ADVANCE CARE PLANNING)
Advance Care Planning     General Advance Care Planning (ACP) Conversation    Date of Conversation: 9/13/2023  Conducted with: Patient with Decision Making Capacity    Healthcare Decision Maker:    Primary Decision Maker: Sydney Diaz - [phone]  Click here to complete Healthcare Decision Makers including selection of the Healthcare Decision Maker Relationship (ie \"Primary\"). Today we documented Decision Maker(s) consistent with Legal Next of Kin hierarchy.     Content/Action Overview:  Has NO ACP documents/care preferences - information provided, considering goals and options  Reviewed DNR/DNI and patient elects Full Code (Attempt Resuscitation)  resuscitation preferences      Length of Voluntary ACP Conversation in minutes:  <16 minutes (Non-Billable)    Suzanna Bonilla, LANIE - CNP

## 2023-09-13 NOTE — PROGRESS NOTES
Medicare Annual Wellness Visit    Jenn Campbell is here for Follow-up Chronic Condition, Diabetes, and Medicare AWV    Assessment & Plan   Medicare annual wellness visit, subsequent  Completed today to include ETOH and depression screening  Advanced care planning/counseling discussion  Healthcare decision maker verified and ACP Discussion performed and documented in note  Type 2 diabetes mellitus without complication, without long-term current use of insulin (HCC)  -     metFORMIN (GLUCOPHAGE) 1000 MG tablet; Take 1 tablet by mouth Daily with supper, Disp-180 tablet, R-0Requesting 1 year supplyNO PRINT  -     Comprehensive Metabolic Panel; Future  -     Lipid Panel; Future  -     Hemoglobin A1C; Future  -     Microalbumin / Creatinine Urine Ratio; Future  Endorses medication compliance, Refill provided, Follow up labs prior to next visit, Denies signs and symptoms of hyper or hypoglycemia, and Diabetes controlled, A1C <7    Recommendations for Preventive Services Due: see orders and patient instructions/AVS.  Recommended screening schedule for the next 5-10 years is provided to the patient in written form: see Patient Instructions/AVS.     Return in about 4 months (around 1/13/2024) for DM, 15min, w/labsprior. Subjective   The following acute and/or chronic problems were also addressed today:  DMII-   Patient reports medication compliance Yes  Diabetic diet compliance frequently  Patient monitors blood sugars regularly seldom   Home glucose readings: 82   Denies hypoglycemic episodes Yes  Denies polyuria, polydipsia, paraesthesia, vision changes? Yes  Engaging in daily exercise?   Walking, biking, 5xa week, 30-45 min each day     Diabetes Management   Topic Date Due    Diabetic foot exam  Never done    Diabetic retinal exam  Never done     Lab Results   Component Value Date/Time    LABA1C 5.6 09/06/2023 01:05 PM   ]  Lab Results   Component Value Date/Time    MALBCR 7 03/28/2023 03:14 PM   ]  Diabetic

## 2023-09-14 ENCOUNTER — PATIENT MESSAGE (OUTPATIENT)
Dept: OTHER | Facility: CLINIC | Age: 58
End: 2023-09-14

## 2023-09-27 ENCOUNTER — PATIENT MESSAGE (OUTPATIENT)
Facility: CLINIC | Age: 58
End: 2023-09-27

## 2023-09-29 PROBLEM — E66.811 CLASS 1 OBESITY DUE TO EXCESS CALORIES WITH SERIOUS COMORBIDITY AND BODY MASS INDEX (BMI) OF 32.0 TO 32.9 IN ADULT: Status: ACTIVE | Noted: 2022-11-08

## 2023-09-29 PROBLEM — E66.09 CLASS 1 OBESITY DUE TO EXCESS CALORIES WITH SERIOUS COMORBIDITY AND BODY MASS INDEX (BMI) OF 32.0 TO 32.9 IN ADULT: Status: ACTIVE | Noted: 2022-11-08

## 2023-09-29 NOTE — TELEPHONE ENCOUNTER
From: Oly Bowden  To: Kimberly Gonzales  Sent: 9/27/2023 6:51 PM EDT  Subject: Jovani Ibarra Refill Due    Hi NP Lorna Woods,  I am due for my Mounjaro refill; however, I'm not sure if I should stay on 10 mg or increase to 12.5. They seem to kind of stop working so well after 3 months, but I know that I only have 12.5 mg and 15 mg left. What do you think?

## 2024-01-09 DIAGNOSIS — E11.9 TYPE 2 DIABETES MELLITUS WITHOUT COMPLICATION, WITHOUT LONG-TERM CURRENT USE OF INSULIN (HCC): ICD-10-CM

## 2024-01-10 RX ORDER — TIRZEPATIDE 12.5 MG/.5ML
12.5 INJECTION, SOLUTION SUBCUTANEOUS WEEKLY
Qty: 6 ML | Refills: 1 | Status: SHIPPED | OUTPATIENT
Start: 2024-01-10

## 2024-02-07 ENCOUNTER — HOSPITAL ENCOUNTER (OUTPATIENT)
Facility: HOSPITAL | Age: 59
Setting detail: SPECIMEN
Discharge: HOME | End: 2024-02-10
Payer: MEDICARE

## 2024-02-07 DIAGNOSIS — E11.9 TYPE 2 DIABETES MELLITUS WITHOUT COMPLICATION, WITHOUT LONG-TERM CURRENT USE OF INSULIN (HCC): ICD-10-CM

## 2024-02-07 LAB
ALBUMIN SERPL-MCNC: 4 G/DL (ref 3.4–5)
ALBUMIN/GLOB SERPL: 1.2 (ref 0.8–1.7)
ALP SERPL-CCNC: 98 U/L (ref 45–117)
ALT SERPL-CCNC: 17 U/L (ref 13–56)
ANION GAP SERPL CALC-SCNC: 3 MMOL/L (ref 3–18)
AST SERPL-CCNC: 16 U/L (ref 10–38)
BILIRUB SERPL-MCNC: 0.3 MG/DL (ref 0.2–1)
BUN SERPL-MCNC: 10 MG/DL (ref 7–18)
BUN/CREAT SERPL: 11 (ref 12–20)
CALCIUM SERPL-MCNC: 9.6 MG/DL (ref 8.5–10.1)
CHLORIDE SERPL-SCNC: 113 MMOL/L (ref 100–111)
CHOLEST SERPL-MCNC: 129 MG/DL
CO2 SERPL-SCNC: 26 MMOL/L (ref 21–32)
CREAT SERPL-MCNC: 0.94 MG/DL (ref 0.6–1.3)
CREAT UR-MCNC: 270 MG/DL (ref 30–125)
EST. AVERAGE GLUCOSE BLD GHB EST-MCNC: 114 MG/DL
GLOBULIN SER CALC-MCNC: 3.4 G/DL (ref 2–4)
GLUCOSE SERPL-MCNC: 109 MG/DL (ref 74–99)
HBA1C MFR BLD: 5.6 % (ref 4.2–5.6)
HDLC SERPL-MCNC: 56 MG/DL (ref 40–60)
HDLC SERPL: 2.3 (ref 0–5)
LDLC SERPL CALC-MCNC: 54.6 MG/DL (ref 0–100)
LIPID PANEL: NORMAL
MICROALBUMIN UR-MCNC: 2.4 MG/DL (ref 0–3)
MICROALBUMIN/CREAT UR-RTO: 9 MG/G (ref 0–30)
POTASSIUM SERPL-SCNC: 4.4 MMOL/L (ref 3.5–5.5)
PROT SERPL-MCNC: 7.4 G/DL (ref 6.4–8.2)
SODIUM SERPL-SCNC: 142 MMOL/L (ref 136–145)
TRIGL SERPL-MCNC: 92 MG/DL
VLDLC SERPL CALC-MCNC: 18.4 MG/DL

## 2024-02-07 PROCEDURE — 82570 ASSAY OF URINE CREATININE: CPT

## 2024-02-07 PROCEDURE — 83036 HEMOGLOBIN GLYCOSYLATED A1C: CPT

## 2024-02-07 PROCEDURE — 80061 LIPID PANEL: CPT

## 2024-02-07 PROCEDURE — 80053 COMPREHEN METABOLIC PANEL: CPT

## 2024-02-07 PROCEDURE — 36415 COLL VENOUS BLD VENIPUNCTURE: CPT

## 2024-02-07 PROCEDURE — 82043 UR ALBUMIN QUANTITATIVE: CPT

## 2024-02-12 ENCOUNTER — OFFICE VISIT (OUTPATIENT)
Facility: CLINIC | Age: 59
End: 2024-02-12
Payer: COMMERCIAL

## 2024-02-12 VITALS
BODY MASS INDEX: 31.58 KG/M2 | HEIGHT: 64 IN | SYSTOLIC BLOOD PRESSURE: 102 MMHG | TEMPERATURE: 97.9 F | RESPIRATION RATE: 18 BRPM | DIASTOLIC BLOOD PRESSURE: 71 MMHG | WEIGHT: 185 LBS | OXYGEN SATURATION: 99 % | HEART RATE: 104 BPM

## 2024-02-12 DIAGNOSIS — E11.9 TYPE 2 DIABETES MELLITUS WITHOUT COMPLICATION, WITHOUT LONG-TERM CURRENT USE OF INSULIN (HCC): Primary | ICD-10-CM

## 2024-02-12 PROCEDURE — G8427 DOCREV CUR MEDS BY ELIG CLIN: HCPCS | Performed by: NURSE PRACTITIONER

## 2024-02-12 PROCEDURE — 99213 OFFICE O/P EST LOW 20 MIN: CPT | Performed by: NURSE PRACTITIONER

## 2024-02-12 PROCEDURE — 2022F DILAT RTA XM EVC RTNOPTHY: CPT | Performed by: NURSE PRACTITIONER

## 2024-02-12 PROCEDURE — 3017F COLORECTAL CA SCREEN DOC REV: CPT | Performed by: NURSE PRACTITIONER

## 2024-02-12 PROCEDURE — 3044F HG A1C LEVEL LT 7.0%: CPT | Performed by: NURSE PRACTITIONER

## 2024-02-12 PROCEDURE — 1036F TOBACCO NON-USER: CPT | Performed by: NURSE PRACTITIONER

## 2024-02-12 PROCEDURE — G8484 FLU IMMUNIZE NO ADMIN: HCPCS | Performed by: NURSE PRACTITIONER

## 2024-02-12 PROCEDURE — G8417 CALC BMI ABV UP PARAM F/U: HCPCS | Performed by: NURSE PRACTITIONER

## 2024-02-12 RX ORDER — QUETIAPINE FUMARATE 300 MG/1
900 TABLET, FILM COATED ORAL
COMMUNITY
Start: 2023-12-18

## 2024-02-12 NOTE — PROGRESS NOTES
\"Have you been to the ER, urgent care clinic since your last visit?  Hospitalized since your last visit?\"    NO    “Have you seen or consulted any other health care providers outside of [facility] since your last visit?”    NO    Jenn DERECK Ingram presents today for No chief complaint on file.      Is someone accompanying this pt? No    Is the patient using any DME equipment during OV? No    Depression Screenin/12/2024     3:54 PM 2023     1:04 PM 2023     4:07 PM 2023     4:00 PM 2022     8:00 AM 6/15/2022     3:00 PM   PHQ-9 Questionaire   Little interest or pleasure in doing things 0 0 0 0 0 0   Feeling down, depressed, or hopeless 0 0 0 0 0 0   PHQ-9 Total Score 0 0 0 0 0 0         2024     3:54 PM 2023     1:04 PM 2023     4:07 PM 2023     4:00 PM 2022     8:00 AM 6/15/2022     3:00 PM 3/15/2022     3:00 PM   PHQ Scores   PHQ2 Score 0 0 0 0 0 0    PHQ2 Score       0   PHQ9 Score 0 0 0 0 0 0        Learning Assessment:  No question data found.    Abuse Screening:       No data to display                Fall Risk       No data to display                OPIOID RISK TOOL       No data to display                      
comorbidity and body mass index (BMI) of 32.0 to 32.9 in adult       [facility],  were reviewed.      SH:  Social History     Tobacco Use    Smoking status: Never    Smokeless tobacco: Never   Vaping Use    Vaping Use: Never used   Substance Use Topics    Alcohol use: Never    Drug use: Never       Medications/Allergies:  Current Outpatient Medications on File Prior to Visit   Medication Sig Dispense Refill    QUEtiapine (SEROQUEL) 300 MG tablet 3 tablets      Tirzepatide (MOUNJARO) 12.5 MG/0.5ML SOPN SC injection Inject 0.5 mLs into the skin once a week 6 mL 1    atorvastatin (LIPITOR) 10 MG tablet TAKE 1 TABLET BY MOUTH DAILY 100 tablet 1    metFORMIN (GLUCOPHAGE) 1000 MG tablet TAKE 1 TABLET BY MOUTH TWICE  DAILY WITH MEALS 200 tablet 1    famotidine (PEPCID) 40 MG tablet TAKE 1 TABLET BY MOUTH DAILY 100 tablet 1    Lancets MISC Check glucose once a day in the morning prior to breakfast      spironolactone (ALDACTONE) 100 MG tablet ceived the following from [facility]: Outside name: spironolactone (ALDACTONE) 100 mg tablet       No current facility-administered medications on file prior to visit.        No Known Allergies    Objective:  /71   Pulse (!) 104   Temp 97.9 °F (36.6 °C)   Resp 18   Ht 1.626 m (5' 4\")   Wt 83.9 kg (185 lb)   SpO2 99%   BMI 31.76 kg/m²  Body mass index is 31.76 kg/m².    Physical assessment  Physical Exam  Vitals and nursing note reviewed.   Constitutional:       Appearance: Normal appearance.   HENT:      Head: Normocephalic and atraumatic.   Eyes:      Pupils: Pupils are equal, round, and reactive to light.   Cardiovascular:      Rate and Rhythm: Normal rate and regular rhythm.      Heart sounds: Normal heart sounds. No murmur heard.     No friction rub. No gallop.   Pulmonary:      Effort: Pulmonary effort is normal. No respiratory distress.      Breath sounds: Normal breath sounds.   Neurological:      Mental Status: She is alert and oriented to person,

## 2024-04-08 RX ORDER — ATORVASTATIN CALCIUM 10 MG/1
TABLET, FILM COATED ORAL
Qty: 100 TABLET | Refills: 2 | Status: SHIPPED | OUTPATIENT
Start: 2024-04-08

## 2024-05-02 ENCOUNTER — PATIENT MESSAGE (OUTPATIENT)
Facility: CLINIC | Age: 59
End: 2024-05-02

## 2024-05-03 DIAGNOSIS — E11.9 TYPE 2 DIABETES MELLITUS WITHOUT COMPLICATION, WITHOUT LONG-TERM CURRENT USE OF INSULIN (HCC): ICD-10-CM

## 2024-05-03 RX ORDER — TIRZEPATIDE 15 MG/.5ML
15 INJECTION, SOLUTION SUBCUTANEOUS WEEKLY
Qty: 6 ML | Refills: 1 | Status: SHIPPED | OUTPATIENT
Start: 2024-05-03

## 2024-05-03 RX ORDER — TIRZEPATIDE 12.5 MG/.5ML
12.5 INJECTION, SOLUTION SUBCUTANEOUS WEEKLY
Qty: 6 ML | Refills: 1 | OUTPATIENT
Start: 2024-05-03

## 2024-07-16 ENCOUNTER — HOSPITAL ENCOUNTER (OUTPATIENT)
Facility: HOSPITAL | Age: 59
Setting detail: SPECIMEN
Discharge: HOME | End: 2024-07-19
Payer: COMMERCIAL

## 2024-07-16 DIAGNOSIS — E11.9 TYPE 2 DIABETES MELLITUS WITHOUT COMPLICATION, WITHOUT LONG-TERM CURRENT USE OF INSULIN (HCC): ICD-10-CM

## 2024-07-16 LAB
ALBUMIN SERPL-MCNC: 3.8 G/DL (ref 3.4–5)
ALBUMIN/GLOB SERPL: 1.1 (ref 0.8–1.7)
ALP SERPL-CCNC: 119 U/L (ref 45–117)
ALT SERPL-CCNC: 17 U/L (ref 13–56)
ANION GAP SERPL CALC-SCNC: 4 MMOL/L (ref 3–18)
AST SERPL-CCNC: 14 U/L (ref 10–38)
BILIRUB SERPL-MCNC: 0.4 MG/DL (ref 0.2–1)
BUN SERPL-MCNC: 11 MG/DL (ref 7–18)
BUN/CREAT SERPL: 10 (ref 12–20)
CALCIUM SERPL-MCNC: 9.7 MG/DL (ref 8.5–10.1)
CHLORIDE SERPL-SCNC: 108 MMOL/L (ref 100–111)
CHOLEST SERPL-MCNC: 136 MG/DL
CO2 SERPL-SCNC: 26 MMOL/L (ref 21–32)
CREAT SERPL-MCNC: 1.05 MG/DL (ref 0.6–1.3)
ERYTHROCYTE [DISTWIDTH] IN BLOOD BY AUTOMATED COUNT: 15.3 % (ref 11.6–14.5)
EST. AVERAGE GLUCOSE BLD GHB EST-MCNC: 114 MG/DL
GLOBULIN SER CALC-MCNC: 3.4 G/DL (ref 2–4)
GLUCOSE SERPL-MCNC: 109 MG/DL (ref 74–99)
HBA1C MFR BLD: 5.6 % (ref 4.2–5.6)
HCT VFR BLD AUTO: 39.3 % (ref 35–45)
HDLC SERPL-MCNC: 60 MG/DL (ref 40–60)
HDLC SERPL: 2.3 (ref 0–5)
HGB BLD-MCNC: 12.5 G/DL (ref 12–16)
LDLC SERPL CALC-MCNC: 63 MG/DL (ref 0–100)
LIPID PANEL: NORMAL
MCH RBC QN AUTO: 26.9 PG (ref 24–34)
MCHC RBC AUTO-ENTMCNC: 31.8 G/DL (ref 31–37)
MCV RBC AUTO: 84.7 FL (ref 78–100)
NRBC # BLD: 0 K/UL (ref 0–0.01)
NRBC BLD-RTO: 0 PER 100 WBC
PLATELET # BLD AUTO: 236 K/UL (ref 135–420)
PMV BLD AUTO: 10.7 FL (ref 9.2–11.8)
POTASSIUM SERPL-SCNC: 4.5 MMOL/L (ref 3.5–5.5)
PROT SERPL-MCNC: 7.2 G/DL (ref 6.4–8.2)
RBC # BLD AUTO: 4.64 M/UL (ref 4.2–5.3)
SODIUM SERPL-SCNC: 138 MMOL/L (ref 136–145)
TRIGL SERPL-MCNC: 65 MG/DL
VLDLC SERPL CALC-MCNC: 13 MG/DL
WBC # BLD AUTO: 5.9 K/UL (ref 4.6–13.2)

## 2024-07-16 PROCEDURE — 85027 COMPLETE CBC AUTOMATED: CPT

## 2024-07-16 PROCEDURE — 80061 LIPID PANEL: CPT

## 2024-07-16 PROCEDURE — 80053 COMPREHEN METABOLIC PANEL: CPT

## 2024-07-16 PROCEDURE — 36415 COLL VENOUS BLD VENIPUNCTURE: CPT

## 2024-07-16 PROCEDURE — 83036 HEMOGLOBIN GLYCOSYLATED A1C: CPT

## 2024-07-22 ENCOUNTER — OFFICE VISIT (OUTPATIENT)
Facility: CLINIC | Age: 59
End: 2024-07-22
Payer: COMMERCIAL

## 2024-07-22 VITALS
TEMPERATURE: 98.2 F | WEIGHT: 188 LBS | RESPIRATION RATE: 18 BRPM | DIASTOLIC BLOOD PRESSURE: 76 MMHG | HEART RATE: 88 BPM | HEIGHT: 64 IN | OXYGEN SATURATION: 97 % | BODY MASS INDEX: 32.1 KG/M2 | SYSTOLIC BLOOD PRESSURE: 104 MMHG

## 2024-07-22 DIAGNOSIS — Z12.31 ENCOUNTER FOR SCREENING MAMMOGRAM FOR MALIGNANT NEOPLASM OF BREAST: ICD-10-CM

## 2024-07-22 DIAGNOSIS — E11.9 TYPE 2 DIABETES MELLITUS WITHOUT COMPLICATION, WITHOUT LONG-TERM CURRENT USE OF INSULIN (HCC): Primary | ICD-10-CM

## 2024-07-22 DIAGNOSIS — L70.8 OTHER ACNE: ICD-10-CM

## 2024-07-22 PROCEDURE — 2022F DILAT RTA XM EVC RTNOPTHY: CPT | Performed by: NURSE PRACTITIONER

## 2024-07-22 PROCEDURE — G8427 DOCREV CUR MEDS BY ELIG CLIN: HCPCS | Performed by: NURSE PRACTITIONER

## 2024-07-22 PROCEDURE — 3017F COLORECTAL CA SCREEN DOC REV: CPT | Performed by: NURSE PRACTITIONER

## 2024-07-22 PROCEDURE — 3044F HG A1C LEVEL LT 7.0%: CPT | Performed by: NURSE PRACTITIONER

## 2024-07-22 PROCEDURE — 99214 OFFICE O/P EST MOD 30 MIN: CPT | Performed by: NURSE PRACTITIONER

## 2024-07-22 PROCEDURE — 1036F TOBACCO NON-USER: CPT | Performed by: NURSE PRACTITIONER

## 2024-07-22 PROCEDURE — G8417 CALC BMI ABV UP PARAM F/U: HCPCS | Performed by: NURSE PRACTITIONER

## 2024-07-22 RX ORDER — SPIRONOLACTONE 100 MG/1
100 TABLET, FILM COATED ORAL DAILY
Qty: 90 TABLET | Refills: 1 | Status: SHIPPED | OUTPATIENT
Start: 2024-07-22

## 2024-07-22 RX ORDER — LANCETS 30 GAUGE
EACH MISCELLANEOUS
Qty: 100 EACH | Refills: 1 | Status: SHIPPED | OUTPATIENT
Start: 2024-07-22

## 2024-07-22 NOTE — PROGRESS NOTES
[address] [place] [address]               [phone]      Jenn Ingram is a 58 y.o. female and presents with Follow-up       Assessment/Plan:    1. Type 2 diabetes mellitus without complication, without long-term current use of insulin (HCC)  -     Lancets MISC; Disp-100 each, R-1, NormalCheck glucose once a day in the morning prior to breakfast  -     Comprehensive Metabolic Panel; Future  -     Lipid Panel; Future  -     Hemoglobin A1C; Future  -     Microalbumin / Creatinine Urine Ratio; Future  2. Other acne  -     spironolactone (ALDACTONE) 100 MG tablet; Take 1 tablet by mouth daily ceived the following from [facility]: Outside name: spironolactone (ALDACTONE) 100 mg tablet, Disp-90 tablet, R-1Normal  3. Encounter for screening mammogram for malignant neoplasm of breast  -     BRANDON DIGITAL SCREEN W OR WO CAD BILATERAL; Future   DM controlled, A1C 5.6%, continue mounjaro at same dose  Refills provided  Health maintenance addressed  Labs prior to next visit  Patient verbalized understanding and is in agreement with this plan of care        Follow up and disposition:   Return in about 4 months (around 11/22/2024) for MAWV, DM, 30min, office, w/labsprior.      Subjective:    Labs obtained prior to visit? Yes  Reviewed with patient? yes    DMII-   Patient reports medication compliance daily  Diabetic diet compliance daily  Patient monitors blood sugars regularly frequently   Home glucose readings: 90 2h post prandial   Denies hypoglycemic episodes Yes  Denies polyuria, polydipsia, paraesthesia, vision changes? Yes  Engaging in daily exercise?  Walking 45 min 5x a week, exercise bike 30 min 2x a week     Diabetes Management   Topic Date Due    Diabetic foot exam  Never done     Lab Results   Component Value Date/Time    LABA1C 5.6 07/16/2024 04:00 PM   ]  Lab Results   Component Value Date/Time    MALBCR 9 02/07/2024 03:10 PM   ]  Diabetic medications:

## 2024-07-22 NOTE — PROGRESS NOTES
Room 8    Jenn Ingram had concerns including Follow-up. for today's visit .     Patient states I would like a referral to the Dermatologist .     1. \"Have you been to the ER, urgent care clinic since your last visit?  Hospitalized since your last visit?\" .NO    2. \"Have you seen or consulted any other health care providers outside of the [facility] since your last visit?\" No     3. For patients aged 45-75: Has the patient had a colonoscopy / FIT/ Cologuard? Yes      If the patient is female:    4. For patients aged 40-74: Has the patient had a mammogram within the past 2 years? Order has been placed       5. For patients aged 21-65: Has the patient had a pap smear? {Cancer Care Gap present? Yes           7/22/2024     3:31 PM   Amb Fall Risk Assessment and TUG Test   Do you feel unsteady or are you worried about falling?  no   2 or more falls in past year? no   Fall with injury in past year? no              7/22/2024     3:31 PM   PHQ-9    Little interest or pleasure in doing things 0   Feeling down, depressed, or hopeless 0   PHQ-2 Score 0   PHQ-9 Total Score 0          Health Maintenance Due   Topic Date Due    Hepatitis B vaccine (1 of 3 - 3-dose series) Never done    Diabetic foot exam  Never done    Shingles vaccine (1 of 2) Never done    Breast cancer screen  09/27/2023    Annual Wellness Visit (Medicare Advantage)  01/01/2024

## 2024-07-23 DIAGNOSIS — E11.9 TYPE 2 DIABETES MELLITUS WITHOUT COMPLICATION, WITHOUT LONG-TERM CURRENT USE OF INSULIN (HCC): Primary | ICD-10-CM

## 2024-07-23 RX ORDER — LANCETS 30 GAUGE
EACH MISCELLANEOUS
Qty: 100 EACH | Refills: 3 | Status: SHIPPED | OUTPATIENT
Start: 2024-07-23

## 2024-07-23 NOTE — TELEPHONE ENCOUNTER
Jenn HAND [facility] Staff (supporting Alfreda Reed, APRN - CNP)11 hours ago (8:49 PM)       Wayne Reed . . . the lancets are One Touch Delica Plus, and the strips are One Touch Ultra.  Thanks.

## 2024-07-29 DIAGNOSIS — E11.9 TYPE 2 DIABETES MELLITUS WITHOUT COMPLICATION, WITHOUT LONG-TERM CURRENT USE OF INSULIN (HCC): ICD-10-CM

## 2024-08-22 DIAGNOSIS — E11.9 TYPE 2 DIABETES MELLITUS WITHOUT COMPLICATION, WITHOUT LONG-TERM CURRENT USE OF INSULIN (HCC): ICD-10-CM

## 2024-08-27 RX ORDER — TIRZEPATIDE 15 MG/.5ML
INJECTION, SOLUTION SUBCUTANEOUS
Qty: 6 ML | Refills: 1 | Status: SHIPPED | OUTPATIENT
Start: 2024-08-27

## 2024-08-28 DIAGNOSIS — E11.9 TYPE 2 DIABETES MELLITUS WITHOUT COMPLICATION, WITHOUT LONG-TERM CURRENT USE OF INSULIN (HCC): ICD-10-CM

## 2024-09-25 DIAGNOSIS — L70.8 OTHER ACNE: ICD-10-CM

## 2024-09-27 RX ORDER — SPIRONOLACTONE 100 MG/1
100 TABLET, FILM COATED ORAL DAILY
Qty: 100 TABLET | Refills: 1 | Status: SHIPPED | OUTPATIENT
Start: 2024-09-27

## 2024-12-12 ENCOUNTER — HOSPITAL ENCOUNTER (OUTPATIENT)
Facility: HOSPITAL | Age: 59
Setting detail: SPECIMEN
Discharge: HOME | End: 2024-12-15

## 2024-12-12 DIAGNOSIS — E11.9 TYPE 2 DIABETES MELLITUS WITHOUT COMPLICATION, WITHOUT LONG-TERM CURRENT USE OF INSULIN (HCC): ICD-10-CM

## 2024-12-12 LAB
ALBUMIN SERPL-MCNC: 4.1 G/DL (ref 3.4–5)
ALBUMIN/GLOB SERPL: 1.2 (ref 0.8–1.7)
ALP SERPL-CCNC: 127 U/L (ref 45–117)
ALT SERPL-CCNC: 18 U/L (ref 13–56)
ANION GAP SERPL CALC-SCNC: 5 MMOL/L (ref 3–18)
AST SERPL-CCNC: 18 U/L (ref 10–38)
BILIRUB SERPL-MCNC: 0.5 MG/DL (ref 0.2–1)
BUN SERPL-MCNC: 12 MG/DL (ref 7–18)
BUN/CREAT SERPL: 11 (ref 12–20)
CALCIUM SERPL-MCNC: 9.8 MG/DL (ref 8.5–10.1)
CHLORIDE SERPL-SCNC: 107 MMOL/L (ref 100–111)
CHOLEST SERPL-MCNC: 147 MG/DL
CO2 SERPL-SCNC: 25 MMOL/L (ref 21–32)
CREAT SERPL-MCNC: 1.11 MG/DL (ref 0.6–1.3)
CREAT UR-MCNC: 92 MG/DL (ref 30–125)
EST. AVERAGE GLUCOSE BLD GHB EST-MCNC: 126 MG/DL
GLOBULIN SER CALC-MCNC: 3.4 G/DL (ref 2–4)
GLUCOSE SERPL-MCNC: 117 MG/DL (ref 74–99)
HBA1C MFR BLD: 6 % (ref 4.2–5.6)
HDLC SERPL-MCNC: 61 MG/DL (ref 40–60)
HDLC SERPL: 2.4 (ref 0–5)
LDLC SERPL CALC-MCNC: 72.6 MG/DL (ref 0–100)
LIPID PANEL: ABNORMAL
MICROALBUMIN UR-MCNC: 0.67 MG/DL (ref 0–3)
MICROALBUMIN/CREAT UR-RTO: 7 MG/G (ref 0–30)
POTASSIUM SERPL-SCNC: 4.6 MMOL/L (ref 3.5–5.5)
PROT SERPL-MCNC: 7.5 G/DL (ref 6.4–8.2)
SODIUM SERPL-SCNC: 137 MMOL/L (ref 136–145)
TRIGL SERPL-MCNC: 67 MG/DL
VLDLC SERPL CALC-MCNC: 13.4 MG/DL

## 2024-12-12 PROCEDURE — 82043 UR ALBUMIN QUANTITATIVE: CPT

## 2024-12-12 PROCEDURE — 80053 COMPREHEN METABOLIC PANEL: CPT

## 2024-12-12 PROCEDURE — 80061 LIPID PANEL: CPT

## 2024-12-12 PROCEDURE — 36415 COLL VENOUS BLD VENIPUNCTURE: CPT

## 2024-12-12 PROCEDURE — 83036 HEMOGLOBIN GLYCOSYLATED A1C: CPT

## 2024-12-12 PROCEDURE — 82570 ASSAY OF URINE CREATININE: CPT

## 2024-12-19 ENCOUNTER — OFFICE VISIT (OUTPATIENT)
Facility: CLINIC | Age: 59
End: 2024-12-19
Payer: MEDICARE

## 2024-12-19 VITALS
RESPIRATION RATE: 18 BRPM | SYSTOLIC BLOOD PRESSURE: 103 MMHG | HEIGHT: 64 IN | BODY MASS INDEX: 31.76 KG/M2 | HEART RATE: 87 BPM | WEIGHT: 186 LBS | TEMPERATURE: 98.3 F | OXYGEN SATURATION: 98 % | DIASTOLIC BLOOD PRESSURE: 72 MMHG

## 2024-12-19 DIAGNOSIS — Z00.00 MEDICARE ANNUAL WELLNESS VISIT, SUBSEQUENT: Primary | ICD-10-CM

## 2024-12-19 DIAGNOSIS — E11.9 TYPE 2 DIABETES MELLITUS WITHOUT COMPLICATION, WITHOUT LONG-TERM CURRENT USE OF INSULIN (HCC): ICD-10-CM

## 2024-12-19 DIAGNOSIS — L70.8 OTHER ACNE: ICD-10-CM

## 2024-12-19 DIAGNOSIS — Z71.89 ADVANCED CARE PLANNING/COUNSELING DISCUSSION: ICD-10-CM

## 2024-12-19 PROCEDURE — 3044F HG A1C LEVEL LT 7.0%: CPT | Performed by: NURSE PRACTITIONER

## 2024-12-19 PROCEDURE — 3017F COLORECTAL CA SCREEN DOC REV: CPT | Performed by: NURSE PRACTITIONER

## 2024-12-19 PROCEDURE — G0439 PPPS, SUBSEQ VISIT: HCPCS | Performed by: NURSE PRACTITIONER

## 2024-12-19 PROCEDURE — G8484 FLU IMMUNIZE NO ADMIN: HCPCS | Performed by: NURSE PRACTITIONER

## 2024-12-19 RX ORDER — HYDROQUINONE 40 MG/G
CREAM TOPICAL 2 TIMES DAILY
COMMUNITY
End: 2024-12-19 | Stop reason: SDUPTHER

## 2024-12-19 RX ORDER — ADAPALENE GEL USP, 0.3% 3 MG/G
GEL TOPICAL
COMMUNITY
End: 2024-12-19 | Stop reason: SDUPTHER

## 2024-12-19 RX ORDER — ADAPALENE GEL USP, 0.3% 3 MG/G
GEL TOPICAL
Qty: 45 G | Refills: 1 | Status: SHIPPED | OUTPATIENT
Start: 2024-12-19

## 2024-12-19 RX ORDER — HYDROQUINONE 40 MG/G
CREAM TOPICAL 2 TIMES DAILY
Qty: 28.35 G | Refills: 1 | Status: SHIPPED | OUTPATIENT
Start: 2024-12-19

## 2024-12-19 ASSESSMENT — PATIENT HEALTH QUESTIONNAIRE - PHQ9
SUM OF ALL RESPONSES TO PHQ9 QUESTIONS 1 & 2: 0
SUM OF ALL RESPONSES TO PHQ QUESTIONS 1-9: 0
1. LITTLE INTEREST OR PLEASURE IN DOING THINGS: NOT AT ALL
2. FEELING DOWN, DEPRESSED OR HOPELESS: NOT AT ALL
SUM OF ALL RESPONSES TO PHQ QUESTIONS 1-9: 0

## 2024-12-19 NOTE — PROGRESS NOTES
Room 4    Jenn Ingram had concerns including Medicare AWV. for today's visit .         Did you take your medication today? yes      1. \"Have you been to the ER, urgent care clinic since your last visit?  Hospitalized since your last visit?\" .NO    2. \"Have you seen or consulted any other health care providers outside of the [facility] since your last visit?\" No     3. For patients aged 45-75: Has the patient had a colonoscopy / FIT/ Cologuard? Yes      If the patient is female:    4. For patients aged 40-74: Has the patient had a mammogram within the past 2 years? Patient states I have to schedule an appointment next year due to insurance       5. For patients aged 21-65: Has the patient had a pap smear? {Cancer Care Gap present?yes           12/19/2024     3:53 PM   PHQ-9    Little interest or pleasure in doing things 0   Feeling down, depressed, or hopeless 0   PHQ-2 Score 0   PHQ-9 Total Score 0          Health Maintenance Due   Topic Date Due    Diabetic foot exam  Never done    Hepatitis B vaccine (1 of 3 - 19+ 3-dose series) Never done    Shingles vaccine (1 of 2) Never done    Breast cancer screen  09/27/2023    Annual Wellness Visit (Medicare Advantage)  01/01/2024    Diabetic retinal exam  12/01/2024

## 2024-12-19 NOTE — ACP (ADVANCE CARE PLANNING)
Advance Care Planning   General Advance Care Planning (ACP) Conversation    Date of Conversation: 12/19/2024  Conducted with: Patient with Decision Making Capacity  Other persons present: None    Healthcare Decision Maker:    Primary Decision Maker: Elvie Ingram - Child - [phone]    Secondary Decision Maker: nico gonzalez - Brother/Sister - [phone]    Today we documented Decision Maker(s) consistent with Legal Next of Kin hierarchy.  Content/Action Overview:  Has ACP document(s) NOT on file - requested patient to provide  Reviewed DNR/DNI and patient elects Full Code (Attempt Resuscitation)  resuscitation preferences      Length of Voluntary ACP Conversation in minutes:  <16 minutes (Non-Billable)    Alfreda Reed, APRN - CNP

## 2024-12-19 NOTE — PROGRESS NOTES
Medicare Annual Wellness Visit    Jenn Ingram is here for Medicare AWV    Assessment & Plan   Medicare annual wellness visit, subsequent  Advanced care planning/counseling discussion  Type 2 diabetes mellitus without complication, without long-term current use of insulin ([facility])  -     Comprehensive Metabolic Panel; Future  -     Lipid Panel; Future  -     Hemoglobin A1C; Future  -     Albumin/Creatinine Ratio, Urine; Future  -     CBC; Future  Other acne  -     Adapalene 0.3 % GEL; Apply topically as needed for acne, once a day, Disp-45 g, R-1Normal  -     hydroquinone 4 % cream; Apply topically 2 times daily Apply topically 2 times daily., Topical, 2 TIMES DAILY Starting u 12/19/2024, Disp-28.35 g, R-1, Normal  MAWV completed, HC decision maker verified, ACP discussion completed  DM controlled, A1C 6.0%  C/o problems with acne, medication refilled  Labs prior to next visit  Patient verbalized understanding and is in agreement with this plan of care       Recommendations for Preventive Services Due: see orders and patient instructions/AVS.  Recommended screening schedule for the next 5-10 years is provided to the patient in written form: see Patient Instructions/AVS.     Return in about 6 months (around 6/19/2025) for DMfoot, DM, 15min, office, w/labsprior.       Subjective   The following acute and/or chronic problems were also addressed today:  DMII-   Patient reports medication compliance Yes  Diabetic diet compliance frequently  Patient monitors blood sugars regularly frequently   Home glucose readings: 95 post prandial   Denies hypoglycemic episodes Yes  Denies polyuria, polydipsia, paraesthesia, vision changes? Yes  Engaging in daily exercise?  Walking 45-60min 5x a week     Diabetes Management   Topic Date Due    Diabetic foot exam  Never done    Diabetic retinal exam  12/01/2024     Lab Results   Component Value Date/Time    LABA1C 6.0 12/12/2024 04:26 PM   ]  No results found for: \"MALBCR\"]  Diabetic

## 2025-01-07 DIAGNOSIS — E11.9 TYPE 2 DIABETES MELLITUS WITHOUT COMPLICATION, WITHOUT LONG-TERM CURRENT USE OF INSULIN (HCC): ICD-10-CM

## 2025-01-08 RX ORDER — TIRZEPATIDE 15 MG/.5ML
INJECTION, SOLUTION SUBCUTANEOUS
Qty: 6 ML | Refills: 1 | Status: SHIPPED | OUTPATIENT
Start: 2025-01-08

## 2025-02-08 DIAGNOSIS — L70.8 OTHER ACNE: ICD-10-CM

## 2025-02-11 RX ORDER — ATORVASTATIN CALCIUM 10 MG/1
10 TABLET, FILM COATED ORAL DAILY
Qty: 100 TABLET | Refills: 2 | Status: SHIPPED | OUTPATIENT
Start: 2025-02-11

## 2025-02-11 RX ORDER — SPIRONOLACTONE 100 MG/1
100 TABLET, FILM COATED ORAL DAILY
Qty: 100 TABLET | Refills: 1 | Status: SHIPPED | OUTPATIENT
Start: 2025-02-11

## 2025-05-14 DIAGNOSIS — E11.9 TYPE 2 DIABETES MELLITUS WITHOUT COMPLICATION, WITHOUT LONG-TERM CURRENT USE OF INSULIN (HCC): ICD-10-CM

## 2025-05-14 RX ORDER — TIRZEPATIDE 15 MG/.5ML
INJECTION, SOLUTION SUBCUTANEOUS
Qty: 6 ML | Refills: 1 | Status: SHIPPED | OUTPATIENT
Start: 2025-05-14

## 2025-06-29 DIAGNOSIS — L70.8 OTHER ACNE: ICD-10-CM

## 2025-07-15 ENCOUNTER — HOSPITAL ENCOUNTER (OUTPATIENT)
Facility: HOSPITAL | Age: 60
Setting detail: SPECIMEN
Discharge: HOME | End: 2025-07-18
Payer: MEDICARE

## 2025-07-15 DIAGNOSIS — E11.9 TYPE 2 DIABETES MELLITUS WITHOUT COMPLICATION, WITHOUT LONG-TERM CURRENT USE OF INSULIN (HCC): ICD-10-CM

## 2025-07-15 LAB
ALBUMIN SERPL-MCNC: 3.7 G/DL (ref 3.4–5)
ALBUMIN/GLOB SERPL: 1.3 (ref 0.8–1.7)
ALP SERPL-CCNC: 112 U/L (ref 45–117)
ALT SERPL-CCNC: 16 U/L (ref 10–35)
ANION GAP SERPL CALC-SCNC: 11 MMOL/L (ref 3–18)
AST SERPL-CCNC: 24 U/L (ref 10–38)
BILIRUB SERPL-MCNC: 0.3 MG/DL (ref 0.2–1)
BUN SERPL-MCNC: 12 MG/DL (ref 6–23)
BUN/CREAT SERPL: 11 (ref 12–20)
CALCIUM SERPL-MCNC: 9.5 MG/DL (ref 8.5–10.1)
CHLORIDE SERPL-SCNC: 106 MMOL/L (ref 98–107)
CHOLEST SERPL-MCNC: 143 MG/DL
CO2 SERPL-SCNC: 23 MMOL/L (ref 21–32)
CREAT SERPL-MCNC: 1.08 MG/DL (ref 0.6–1.3)
CREAT UR-MCNC: 142 MG/DL (ref 30–125)
ERYTHROCYTE [DISTWIDTH] IN BLOOD BY AUTOMATED COUNT: 14.7 % (ref 11.6–14.5)
EST. AVERAGE GLUCOSE BLD GHB EST-MCNC: 121 MG/DL
GLOBULIN SER CALC-MCNC: 2.8 G/DL (ref 2–4)
GLUCOSE SERPL-MCNC: 101 MG/DL (ref 74–108)
HBA1C MFR BLD: 5.9 % (ref 4.2–5.6)
HCT VFR BLD AUTO: 36.3 % (ref 35–45)
HDLC SERPL-MCNC: 55 MG/DL (ref 40–60)
HDLC SERPL: 2.6 (ref 0–5)
HGB BLD-MCNC: 11.5 G/DL (ref 12–16)
LDLC SERPL CALC-MCNC: 64 MG/DL (ref 0–100)
MCH RBC QN AUTO: 27.3 PG (ref 24–34)
MCHC RBC AUTO-ENTMCNC: 31.7 G/DL (ref 31–37)
MCV RBC AUTO: 86.2 FL (ref 78–100)
MICROALBUMIN UR-MCNC: <1.2 MG/DL (ref 0–3)
MICROALBUMIN/CREAT UR-RTO: ABNORMAL MG/G (ref 0–30)
NRBC # BLD: 0 K/UL (ref 0–0.01)
NRBC BLD-RTO: 0 PER 100 WBC
PLATELET # BLD AUTO: 237 K/UL (ref 135–420)
PMV BLD AUTO: 10.8 FL (ref 9.2–11.8)
POTASSIUM SERPL-SCNC: 4.5 MMOL/L (ref 3.5–5.5)
PROT SERPL-MCNC: 6.5 G/DL (ref 6.4–8.2)
RBC # BLD AUTO: 4.21 M/UL (ref 4.2–5.3)
SODIUM SERPL-SCNC: 141 MMOL/L (ref 136–145)
TRIGL SERPL-MCNC: 120 MG/DL (ref 0–150)
VLDLC SERPL CALC-MCNC: 24 MG/DL
WBC # BLD AUTO: 6.5 K/UL (ref 4.6–13.2)

## 2025-07-15 PROCEDURE — 80061 LIPID PANEL: CPT

## 2025-07-15 PROCEDURE — 85027 COMPLETE CBC AUTOMATED: CPT

## 2025-07-15 PROCEDURE — 83036 HEMOGLOBIN GLYCOSYLATED A1C: CPT

## 2025-07-15 PROCEDURE — 82570 ASSAY OF URINE CREATININE: CPT

## 2025-07-15 PROCEDURE — 80053 COMPREHEN METABOLIC PANEL: CPT

## 2025-07-15 PROCEDURE — 82043 UR ALBUMIN QUANTITATIVE: CPT

## 2025-07-15 PROCEDURE — 36415 COLL VENOUS BLD VENIPUNCTURE: CPT

## 2025-07-15 RX ORDER — HYDROQUINONE 40 MG/G
CREAM TOPICAL 2 TIMES DAILY
Qty: 28.35 G | Refills: 1 | Status: SHIPPED | OUTPATIENT
Start: 2025-07-15

## 2025-07-15 RX ORDER — ADAPALENE GEL USP, 0.3% 3 MG/G
GEL TOPICAL
Qty: 45 G | Refills: 1 | Status: SHIPPED | OUTPATIENT
Start: 2025-07-15

## 2025-07-19 SDOH — ECONOMIC STABILITY: INCOME INSECURITY: IN THE LAST 12 MONTHS, WAS THERE A TIME WHEN YOU WERE NOT ABLE TO PAY THE MORTGAGE OR RENT ON TIME?: NO

## 2025-07-19 SDOH — ECONOMIC STABILITY: FOOD INSECURITY: WITHIN THE PAST 12 MONTHS, YOU WORRIED THAT YOUR FOOD WOULD RUN OUT BEFORE YOU GOT MONEY TO BUY MORE.: NEVER TRUE

## 2025-07-19 SDOH — ECONOMIC STABILITY: FOOD INSECURITY: WITHIN THE PAST 12 MONTHS, THE FOOD YOU BOUGHT JUST DIDN'T LAST AND YOU DIDN'T HAVE MONEY TO GET MORE.: NEVER TRUE

## 2025-07-22 ENCOUNTER — OFFICE VISIT (OUTPATIENT)
Facility: CLINIC | Age: 60
End: 2025-07-22
Payer: MEDICARE

## 2025-07-22 VITALS
HEART RATE: 81 BPM | SYSTOLIC BLOOD PRESSURE: 94 MMHG | HEIGHT: 64 IN | DIASTOLIC BLOOD PRESSURE: 68 MMHG | WEIGHT: 193.6 LBS | BODY MASS INDEX: 33.05 KG/M2 | TEMPERATURE: 97.6 F | OXYGEN SATURATION: 97 % | RESPIRATION RATE: 18 BRPM

## 2025-07-22 DIAGNOSIS — E11.9 TYPE 2 DIABETES MELLITUS WITHOUT COMPLICATION, WITHOUT LONG-TERM CURRENT USE OF INSULIN (HCC): Primary | ICD-10-CM

## 2025-07-22 DIAGNOSIS — K21.9 GASTROESOPHAGEAL REFLUX DISEASE WITHOUT ESOPHAGITIS: ICD-10-CM

## 2025-07-22 PROCEDURE — 1036F TOBACCO NON-USER: CPT | Performed by: NURSE PRACTITIONER

## 2025-07-22 PROCEDURE — 2022F DILAT RTA XM EVC RTNOPTHY: CPT | Performed by: NURSE PRACTITIONER

## 2025-07-22 PROCEDURE — G8427 DOCREV CUR MEDS BY ELIG CLIN: HCPCS | Performed by: NURSE PRACTITIONER

## 2025-07-22 PROCEDURE — G8417 CALC BMI ABV UP PARAM F/U: HCPCS | Performed by: NURSE PRACTITIONER

## 2025-07-22 PROCEDURE — 3017F COLORECTAL CA SCREEN DOC REV: CPT | Performed by: NURSE PRACTITIONER

## 2025-07-22 PROCEDURE — 99214 OFFICE O/P EST MOD 30 MIN: CPT | Performed by: NURSE PRACTITIONER

## 2025-07-22 PROCEDURE — 3044F HG A1C LEVEL LT 7.0%: CPT | Performed by: NURSE PRACTITIONER

## 2025-07-22 RX ORDER — PANTOPRAZOLE SODIUM 20 MG/1
20 TABLET, DELAYED RELEASE ORAL
Qty: 90 TABLET | Refills: 1 | Status: SHIPPED | OUTPATIENT
Start: 2025-07-22

## 2025-07-22 RX ORDER — PANTOPRAZOLE SODIUM 40 MG/1
20 TABLET, DELAYED RELEASE ORAL DAILY
Qty: 30 TABLET | Status: CANCELLED | OUTPATIENT
Start: 2025-07-22

## 2025-07-22 RX ORDER — BLOOD-GLUCOSE METER
EACH MISCELLANEOUS
Qty: 1 EACH | Refills: 0 | Status: SHIPPED | OUTPATIENT
Start: 2025-07-22

## 2025-07-22 RX ORDER — LANCETS
1 EACH MISCELLANEOUS DAILY
Qty: 100 EACH | Refills: 3 | Status: SHIPPED | OUTPATIENT
Start: 2025-07-22

## 2025-07-22 ASSESSMENT — PATIENT HEALTH QUESTIONNAIRE - PHQ9
SUM OF ALL RESPONSES TO PHQ QUESTIONS 1-9: 0
SUM OF ALL RESPONSES TO PHQ QUESTIONS 1-9: 0
1. LITTLE INTEREST OR PLEASURE IN DOING THINGS: NOT AT ALL
SUM OF ALL RESPONSES TO PHQ QUESTIONS 1-9: 0
2. FEELING DOWN, DEPRESSED OR HOPELESS: NOT AT ALL
SUM OF ALL RESPONSES TO PHQ QUESTIONS 1-9: 0

## 2025-07-22 NOTE — PROGRESS NOTES
ROOM 8 STUDENT OK    Have you been to the ER, urgent care clinic since your last visit?  Hospitalized since your last visit?   NO    Have you seen or consulted any other health care providers outside our system since your last visit?   NO    Have you had a mammogram?”   NO    Date of last Mammogram: 9/27/2022       “Have you had a diabetic eye exam?”    NO- Scheduled for 08/7/25     Date of last diabetic eye exam: 12/1/2023

## 2025-07-22 NOTE — PROGRESS NOTES
[address] [place] [address]               [phone]      Jenn Ingram is a 59 y.o. female and presents with Follow-up (6 month), Diabetes, and Discuss Medications (Patient received a letter about one touch products not being covered anymore)       Assessment/Plan:    1. Type 2 diabetes mellitus without complication, without long-term current use of insulin (HCC)  -     Blood Glucose Monitoring Suppl (CONTOUR NEXT GEN MONITOR) AYLA; Use to check glucose once a day, Disp-1 each, R-0Normal  -     blood glucose test strips (CONTOUR NEXT TEST) strip; 1 each by In Vitro route daily As needed., Disp-100 each, R-3Normal  -     Microlet Lancets MISC; DAILY Starting Tue 7/22/2025, Disp-100 each, R-3, Normal  -     Comprehensive Metabolic Panel; Future  -     Lipid Panel; Future  -     Hemoglobin A1C; Future  2. Gastroesophageal reflux disease without esophagitis     Assessment & Plan  Diabetes Mellitus  - Diabetes is well-managed with an A1c of 5.9  - Blood sugar readings typically in the 80s before meals and 90s after meals  - A new glucose testing device and supplies will be provided  - Continue current medication regimen (Mounjaro 15 mg once a week) and dietary practices, including avoiding fried, fatty foods, and monitoring carbohydrate and simple sugar intake    Hyperlipidemia  - Cholesterol levels are well-controlled with a total cholesterol of 143 and LDL of 64  - Blood pressure is 94/68, pulse is 81, oxygen saturation is 97%  - Continue atorvastatin 10 mg daily  - Maintain current dietary practices    Gastroesophageal Reflux Disease  - Currently taking Protonix 20 mg daily  - Prescription for Protonix 20 mg tablets will be sent to pharmacy  - Liver function is good  - Ensure adequate hydration, especially in hot weather    Acne  - Taking Aldactone for acne management  - Skin is warm and dry  - Continue current medication regimen  - No additional refills needed

## 2025-08-16 DIAGNOSIS — L70.8 OTHER ACNE: ICD-10-CM

## 2025-08-19 RX ORDER — SPIRONOLACTONE 100 MG/1
100 TABLET, FILM COATED ORAL DAILY
Qty: 100 TABLET | Refills: 1 | Status: SHIPPED | OUTPATIENT
Start: 2025-08-19